# Patient Record
Sex: FEMALE | Race: WHITE | NOT HISPANIC OR LATINO | URBAN - METROPOLITAN AREA
[De-identification: names, ages, dates, MRNs, and addresses within clinical notes are randomized per-mention and may not be internally consistent; named-entity substitution may affect disease eponyms.]

---

## 2024-04-02 ENCOUNTER — TELEPHONE (OUTPATIENT)
Age: 39
End: 2024-04-02

## 2024-04-02 NOTE — TELEPHONE ENCOUNTER
Patient is requesting a call back to schedule with Surgical WM- she requests to have a voicemail left if she cannot answer the phone. Please call the patient back at [phone] to schedule with surgical.

## 2024-06-25 ENCOUNTER — OFFICE VISIT (OUTPATIENT)
Dept: BARIATRICS | Facility: CLINIC | Age: 39
End: 2024-06-25

## 2024-06-25 VITALS
HEIGHT: 65 IN | BODY MASS INDEX: 41.32 KG/M2 | HEART RATE: 78 BPM | TEMPERATURE: 97.4 F | SYSTOLIC BLOOD PRESSURE: 121 MMHG | WEIGHT: 248 LBS | DIASTOLIC BLOOD PRESSURE: 77 MMHG | RESPIRATION RATE: 17 BRPM

## 2024-06-25 DIAGNOSIS — K21.9 GASTROESOPHAGEAL REFLUX DISEASE, UNSPECIFIED WHETHER ESOPHAGITIS PRESENT: ICD-10-CM

## 2024-06-25 DIAGNOSIS — E66.01 OBESITY, CLASS III, BMI 40-49.9 (MORBID OBESITY) (HCC): Primary | ICD-10-CM

## 2024-06-25 PROBLEM — E66.813 OBESITY, CLASS III, BMI 40-49.9 (MORBID OBESITY): Status: ACTIVE | Noted: 2024-06-25

## 2024-06-25 PROBLEM — F41.9 ANXIETY AND DEPRESSION: Status: ACTIVE | Noted: 2024-06-25

## 2024-06-25 PROBLEM — N39.3 STRESS INCONTINENCE: Status: ACTIVE | Noted: 2024-06-25

## 2024-06-25 PROBLEM — M48.9 CERVICAL SPINE DISEASE: Status: ACTIVE | Noted: 2020-05-14

## 2024-06-25 PROBLEM — F32.A ANXIETY AND DEPRESSION: Status: ACTIVE | Noted: 2024-06-25

## 2024-06-25 PROCEDURE — 99203 OFFICE O/P NEW LOW 30 MIN: CPT | Performed by: PHYSICIAN ASSISTANT

## 2024-06-25 RX ORDER — OMEPRAZOLE 20 MG/1
20 CAPSULE, DELAYED RELEASE ORAL DAILY
COMMUNITY

## 2024-06-25 RX ORDER — SERTRALINE HYDROCHLORIDE 100 MG/1
100 TABLET, FILM COATED ORAL DAILY
COMMUNITY

## 2024-06-25 NOTE — ASSESSMENT & PLAN NOTE
On omeprazole.    -should improve with weight loss, dietary, and lifestyle changes  -EGD last year

## 2024-06-25 NOTE — ASSESSMENT & PLAN NOTE
-Discussed options of [facility]-Intensive Lifestyle Intervention Program, Very Low Calorie Diet-VLCD, Conservative Program, Rajan-En-Y Gastric Bypass, and Vertical Sleeve Gastrectomy and the role of weight loss medications.  -Initial weight loss goal of 5-10% weight loss for improved health  -Screening labs and records reviewed from prior    -Patient is interested in pursuing surgery from weight loss. She would like to avoid long term medication use.  QR code given to view information.  Discussed surgical process and she will schedule with surgeon to discuss

## 2024-07-02 ENCOUNTER — CONSULT (OUTPATIENT)
Dept: BARIATRICS | Facility: CLINIC | Age: 39
End: 2024-07-02
Payer: COMMERCIAL

## 2024-07-02 VITALS
HEIGHT: 65 IN | TEMPERATURE: 98.1 F | BODY MASS INDEX: 41.99 KG/M2 | WEIGHT: 252 LBS | HEART RATE: 103 BPM | SYSTOLIC BLOOD PRESSURE: 128 MMHG | DIASTOLIC BLOOD PRESSURE: 70 MMHG

## 2024-07-02 DIAGNOSIS — K21.9 GASTROESOPHAGEAL REFLUX DISEASE, UNSPECIFIED WHETHER ESOPHAGITIS PRESENT: ICD-10-CM

## 2024-07-02 DIAGNOSIS — F32.A ANXIETY AND DEPRESSION: ICD-10-CM

## 2024-07-02 DIAGNOSIS — F41.9 ANXIETY AND DEPRESSION: ICD-10-CM

## 2024-07-02 DIAGNOSIS — E66.01 OBESITY, CLASS III, BMI 40-49.9 (MORBID OBESITY) (HCC): Primary | ICD-10-CM

## 2024-07-02 PROCEDURE — 99204 OFFICE O/P NEW MOD 45 MIN: CPT | Performed by: SURGERY

## 2024-07-02 NOTE — PROGRESS NOTES
BARIATRIC INITIAL CONSULT - BARIATRIC SURGERY    Brittney Heart 38 y.o. female MRN: [id number]  Unit/Bed#:  Encounter: [id number]      HPI:  Brittney Heart is a 38 y.o. female who presents with a longstanding history of morbid obesity and inability to sustain a meaningful weight loss.    Here today to discuss bariatric options.    Visit type: initial visit    Symptoms: excess weight and inability to loss weight    Associated Symptoms: depressed mood and anxiety    Associated Conditions: abdominal obesity  Disease Complications: none  Weight Loss Interest: high  Previous Diet Trials: low calorie     Exercise Frequency:infrequency  Types of Exercise: walking        Review of Systems   All other systems reviewed and are negative.      Historical Information   No past medical history on file.  Past Surgical History:   Procedure Laterality Date    GALLBLADDER SURGERY  05/30/2023     Social History   Social History     Substance and Sexual Activity   Alcohol Use Never     Social History     Substance and Sexual Activity   Drug Use Never     Social History     Tobacco Use   Smoking Status Never    Passive exposure: Past   Smokeless Tobacco Never     Family History: non-contributory    Meds/Allergies   all medications and allergies reviewed  Allergies   Allergen Reactions    Penicillin G Rash       Objective       Current Vitals:            Invasive Devices       None                   Physical Exam  Vitals reviewed.   Constitutional:       General: She is not in acute distress.     Appearance: She is well-developed. She is not diaphoretic.   HENT:      Head: Normocephalic and atraumatic.      Right Ear: External ear normal.      Left Ear: External ear normal.      Nose: Nose normal.   Eyes:      General: No scleral icterus.        Right eye: No discharge.         Left eye: No discharge.      Conjunctiva/sclera: Conjunctivae normal.   Neurological:      Mental Status: She is alert and oriented to person, place, and  time.   Psychiatric:         Mood and Affect: Mood and affect normal.         Behavior: Behavior normal.         Thought Content: Thought content normal.         Judgment: Judgment normal.         Lab Results: I have personally reviewed pertinent lab results.    Imaging: I have personally reviewed pertinent reports.    EKG, Pathology, and Other Studies: I have personally reviewed pertinent reports.        Assessment/PLAN:    38 y.o. female with a long standing h/o of obesity and inability to sustain any meaningful weight loss on her own despite several attempts.    She is interested in the Laparoscopic gastric bypass possible sleeve gastrectomy.  We have discussed both options with her today.    As a part of her pre op evaluation, she will be referred to a cardiologist for risk stratification and for a sleep evaluation and consult to rule out obstructive sleep apnea if deemed necessary based on her score.    She needs an EGD to evaluate the anatomy of her GI tract prior to the operation.  I have spent over 30 minutes with her face to face in the office today discussing her options and details of the surgery. We have seen an animation of the surgery on the computer that illustrates how the operation is done and how the anatomy will be altered with the procedure. Over 50% of this was coordinating care.    I have used the MBSAQIP bariatric surgical risk/benefit calculator and we have discussed the results as part of the preop education.  She was given the opportunity to ask questions and I have answered all of them.  I have discussed and educated the patient with regards to the components of our multidisciplinary program and the importance of compliance and follow up in the post operative period. The patient was also instructed with regards to the importance of behavior modification, nutritional counseling, support meeting attendance and lifestyle changes that are important to ensure success.     Although there is a  great statistical chance of improvement or even resolution of most of her associated comorbidities, the results vary from patient to patient and they largely depend on her commitment and compliance.     I have reviewed instructions for stopping or tapering anti-obesity medications prior to surgery.      I have encouraged her to lose 5-10 lbs prior to the operation.      Leon Patricio MD  7/2/2024  3:25 PM

## 2024-07-02 NOTE — PROGRESS NOTES
- Height/Weight:  65 , 252 lb  - BMI:  41.9  - Medical conditions related to obesity: no  - Does the patient smoke/vape (nicotine, marijuana, hookah, etc): no  - StopBANG:  3/8  - Does the patient currently take a weight loss medication: no

## 2024-07-15 ENCOUNTER — CLINICAL SUPPORT (OUTPATIENT)
Dept: BARIATRICS | Facility: CLINIC | Age: 39
End: 2024-07-15

## 2024-07-15 DIAGNOSIS — E66.01 OBESITY, CLASS III, BMI 40-49.9 (MORBID OBESITY) (HCC): Primary | ICD-10-CM

## 2024-07-15 PROCEDURE — RECHECK

## 2024-07-15 NOTE — PROGRESS NOTES
Spoke to patient on the phone:    Patient is interested in the bariatric surgical process. Patient qualifies for surgery with current comorbidities and BMI. Discussed the entire surgical workup process and all requirements of [facility] program and patient's insurance. Answered all questions at the time of the phone call. All SW/RD questions redirected to the next appointment, the 2-hour evaluation.    Patient has been informed to contact insurance to verify there is Bariatric surgery  coverage written on the policy prior next appointment. Also to discuss of any out of pocket expense if insurance does not cover at 100%      Patient verbalized understanding of the surgical process at [facility] and had no further questions at this time.

## 2024-07-24 ENCOUNTER — TELEPHONE (OUTPATIENT)
Dept: BARIATRICS | Facility: CLINIC | Age: 39
End: 2024-07-24

## 2024-07-24 NOTE — TELEPHONE ENCOUNTER
January 5, 2023    Meaghan was seen today for recheck.    Diagnoses and all orders for this visit:    Urethral diverticulum  -     Case Request: CYSTOSCOPY, WITH OPEN EXCISION OF URETHRAL DIVERTICULUM; Standing  -     Case Request: CYSTOSCOPY, WITH OPEN EXCISION OF URETHRAL DIVERTICULUM    Mixed incontinence  -     Case Request: CYSTOSCOPY, WITH OPEN EXCISION OF URETHRAL DIVERTICULUM; Standing  -     Case Request: CYSTOSCOPY, WITH OPEN EXCISION OF URETHRAL DIVERTICULUM    Myalgia of pelvic floor  -     Case Request: CYSTOSCOPY, WITH OPEN EXCISION OF URETHRAL DIVERTICULUM; Standing  -     Case Request: CYSTOSCOPY, WITH OPEN EXCISION OF URETHRAL DIVERTICULUM    High-tone pelvic floor dysfunction  -     Case Request: CYSTOSCOPY, WITH OPEN EXCISION OF URETHRAL DIVERTICULUM; Standing  -     Case Request: CYSTOSCOPY, WITH OPEN EXCISION OF URETHRAL DIVERTICULUM    Other orders  -     Void on call to OR; Standing  -     Forced Air Warming Device; Standing  -     Cleanse skin for procedure; Standing  -     Notify Provider - Anticoagulants and Antiplatelets; Standing  -     Glucose monitor nursing POCT; Standing  -     NPO per Anesthesia Guidelines for Procedure/Surgery Except for: Meds; Standing  -     Apply Pneumatic Compression Device (PCD); Standing  -     Pneumatic Compression Device (PCD) (Equipment); Standing  -     ceFAZolin (ANCEF) intermittent infusion 2 g in 50 mL dextrose PREMIX  -     ceFAZolin (ANCEF) intermittent infusion 2 g in 50 mL dextrose PREMIX      Discussed that the treated of the urethral diverticulum is surgical.  Discussed that given her pelvic floor dysfunction however that the surgery may not help any pain that she is having and that in this setting I would stage any stress incontinence procedures.  Discussed the high likelihood of her having to go to pelvic floor physical therapy after the surgery as well.      We discussed that the risks to the procedure include but not limited to cardiovascular  LVM for patient to return call and provide subscriber information for insurance ([employer] / [employer]) and to upload insurance card for [facility] into [employer].   "risks of anesthesia, nerve injury, bleeding, infection, persistent or worsening stress incontinence or urge incontinence, need for post-operative parra catheter, need for further procedures including for recurrence, stricture, and fistula.  Patient expressed understanding and agreeable to proceed.     10 minutes were spent today on the day of the encounter in reviewing the EMR including interpretation of the MRI, direct patient care including surgical counseling, coordination of care and documentation    Delfina Allen MD MPH  (she/her/hers)   of Urology  [facility]      Subjective    She is here today for follow up after her MRI. She denies any changes in health since last visit    BP (!) 143/97   Pulse 88   Ht 1.651 m (5' 5\")   Wt 68 kg (150 lb)   BMI 24.96 kg/m    GENERAL: healthy, alert and no distress  EYES: Eyes grossly normal to inspection, conjunctivae and sclerae normal  HENT: normal cephalic/atraumatic.  External ears, nose and mouth without ulcers or lesions.  RESP: no audible wheeze, cough, or visible cyanosis.  No visible retractions or increased work of breathing.  Able to speak fully in complete sentences.  NEURO: Cranial nerves grossly intact, mentation intact and speech normal  PSYCH: mentation appears normal, affect normal/bright, judgement and insight intact, normal speech and appearance well-groomed    MRI images reviewed. On my interpretation there is a small urethral diverticulum posterior on the right side    CC  Patient Care Team:  Mirza Elliott MD as PCP - General (Family Practice)  Karl Hurt MD as MD (Urology)  Delfina Allen MD as MD (Urology)  Delfina Allen MD as Assigned Surgical Provider          "

## 2024-09-23 ENCOUNTER — TELEPHONE (OUTPATIENT)
Dept: BARIATRICS | Facility: CLINIC | Age: 39
End: 2024-09-23

## 2024-11-06 ENCOUNTER — TELEPHONE (OUTPATIENT)
Age: 39
End: 2024-11-06

## 2024-11-06 NOTE — TELEPHONE ENCOUNTER
"Pt called in after hours to r/s Eval appts. 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 Pt was told spots for Dec 2 will be on \"hold\" for her until office final approval. Please advise.  "

## 2024-11-07 NOTE — TELEPHONE ENCOUNTER
Ryan and confirmed the 12/2/24 eval appts are already schedule for 930 am-RD and 1030 am-SW that day, and to call back if she needs to r/s those appts

## 2024-11-26 NOTE — PROGRESS NOTES
"Bariatric Behavioral Health Evaluation     weight check  Surgeon:  Dr Patricio    Starting weight:  252 #  Today's weight: 252 #    Presenting Problem:   .Brittney Heart  is a 39 y.o.   female    :  1985   Patient presented with overall concerns of obesity.  Stated that weight has impacted quality of life and has current future concerns with lack of mobility, movement, chronic pain, and overall health.  Has attempted various weight loss plans in the past including exercise, and healthy eating    Patient is Interested in exploring bariatric surgery.as an option for  weight loss goals.        Is the patient seeking Bariatric Surgery?   YES  Sister in law is post surgery - and also members of her family.   Old co worker post bariatric surgery     Current Barriers to Change: work schedule     Realizes Post- Op Requirements? Yes, and will learn more meeting with the dietitians and social workers through the process     Pre-morbid level of function and history of present illness: GERD      Stressors and Supports: family members are supportive      Living situation: , two sons ( 11 &  4)   Dinner with family.  Cooking is shared.      Work: Teacher [employer]    Physical Activity: none currently.   Does have a treadmill at home.  Elliptical in home    Family History (medical, traditions, culture, rules/routines around food):    is from  \"[place]\"     Spouse  overweight    Older son sugar cravings.      Mental Health, Trauma and Substance use Assessment    Psychiatric/Psychological Treatment Diagnosis:   Anxiety and Depression  w/Rx followed by PCP.      History of Eating Disorder:  none noted     Outpatient Counselor Yes  Has current therapy.  [facility]  (In person)      Psychiatrist -  Yes.  Dr. Orellana     Have you had any Mental Health Higher level of care (ED, PHP or Inpatient ) Treatment? No      Drug and/or Alcohol use and treatment history: none     Have you had any " Substance Use Treatment? No     Tobacco/Vaping History: none  Patient agrees to remain nicotine free post surgery.    Domestic Violence: No      Abuse or Trauma History: none noted      Risk Assessment    Identified support system intact.     Risk of harm to self or others:  none noted during evaluation  No HI/SI      Presence of Audio/Visual Hallucinations: Not reported during evaluation     Access to weapons: Not reported during evaluation     Observation: this interview only (SW and RD will follow Brittney Heart through the bariatric program)     Based on the previous information, the client presents the following risk of harm to self or others:  Low risk        Physical/Mental Health Status:              Appearance: appropriate           Sociability: average           Affect: appropriate           Mood: calm           Thought Process: coherent           Speech: normal           Content: no impairment           Orientation: person  Yes , place  Yes , time  Yes , normal attention span  Yes , normal memory  Yes   and normal judgement  Yes            Insight: emotional  good       BARIATRIC SURGERY EDUCATION CHECKLIST    Patient has received the following education related to the bariatric surgery process and understands:    Patients may be required to complete a psychiatric evaluation and receive clearance for surgery from mental health provider.    Patients who undergo weight loss surgery are at higher risk of increased mental health concerns and suicide attempts.    Patients may be required to complete a full substance abuse evaluation and then complete all treatment recommendations prior to surgery.    If diagnosis of abuse/dependence results, patient may be required to remain sober for one (1) year before having bariatric surgery.    Patients on psychiatric medications should check with their provider to discuss psychiatric medications and the changes in absorption.  Patient should discuss all time release  medications with provider and take all medications as prescribed.    The recommendation is that there is no use of any tobacco products, Hookah or vapes for the bariatric post-operation patient.    Bariatric surgery patients should not consume alcohol as a post-operative patient as it may increase risk of numerous health conditions including but not limited to alcohol abuse and ulcers.    There is a possibility of weight regain if patient does not follow all program guidelines and recommendations.    Bariatric surgery patients should exercise thirty (30) to sixty (60) minutes per day to maintain post-surgical weight loss.    Research indicates that bariatric patients are more successful when they see a therapist for up to two (2) years post-op.    Patients will follow all medical and dietary recommendations provided.    Patient will keep all scheduled appointments and follow up with their physician for a minimum of five (5) years.    Patient will take all vitamins as recommended.  Post-operative vitamins are life-long.    There is a goal month set.  All requirements should be met by this time. Don't wait to get started!    There is a deadline month set.  All requirements must be finished by this time and if not, the patient will be halted in the surgery process. The patient can be referred to the medical weight management program or can come back to the surgical program once the unfinished tasks from the previous program are completed.      Female patients of childbearing years are informed that pregnancy is not recommended until 12 - 18 months post-op.      Recommendations: Recommended for surgery  yes    Social Service Note:  Patient presented for behavioral health evaluation for the bariatric program.   Positive for Mental Health with diagnosis of Anxiety and Depression w/Rx.    Current talk therapy.   Current access to Psychiatry.   No reported Drug and/or Alcohol abuse or treatment.  No reported tobacco use.   Patient educated regarding the impact of nicotine and alcohol on the post surgery bariatric patient. Patient has a negative family history of tobacco and alcohol addiction.     Patient has not reported contraindications for bariatric surgery.  Patient will begin making changes with relationship with food through behavior modifications and mindful techniques.  Tracking food intake for one week every three months can assist with weight maintenance and self accountability for post surgery success.   and Dietitian follow up visits are available for pre and post surgery support.  Bariatric support group is available monthly.   Patient verbalized the ability to start making changes and create a healthy relationship around nutrition.     Patient meets criteria for surgery at this time and is referred back to the bariatric surgeon.        Tavo Talbot LCSW

## 2024-12-02 ENCOUNTER — TELEPHONE (OUTPATIENT)
Dept: BARIATRICS | Facility: CLINIC | Age: 39
End: 2024-12-02

## 2024-12-02 ENCOUNTER — CLINICAL SUPPORT (OUTPATIENT)
Dept: BARIATRICS | Facility: CLINIC | Age: 39
End: 2024-12-02

## 2024-12-02 VITALS — BODY MASS INDEX: 41.99 KG/M2 | HEIGHT: 65 IN | WEIGHT: 252 LBS

## 2024-12-02 DIAGNOSIS — E66.01 OBESITY, CLASS III, BMI 40-49.9 (MORBID OBESITY) (HCC): Primary | ICD-10-CM

## 2024-12-02 DIAGNOSIS — K21.9 GASTROESOPHAGEAL REFLUX DISEASE, UNSPECIFIED WHETHER ESOPHAGITIS PRESENT: ICD-10-CM

## 2024-12-02 DIAGNOSIS — Z01.818 PRE-OPERATIVE CLEARANCE: ICD-10-CM

## 2024-12-02 DIAGNOSIS — F41.9 ANXIETY AND DEPRESSION: ICD-10-CM

## 2024-12-02 DIAGNOSIS — F32.A ANXIETY AND DEPRESSION: ICD-10-CM

## 2024-12-02 PROCEDURE — RECHECK: Performed by: DIETITIAN, REGISTERED

## 2024-12-02 PROCEDURE — RECHECK

## 2024-12-02 NOTE — TELEPHONE ENCOUNTER
Spoke with patient and stressed the importance of being consistent with the required visits of her insurance. I informed her that if she is not consistent with these appointments her case may be denied by the insurance. Patient verbalized an understanding of this information.

## 2024-12-02 NOTE — PROGRESS NOTES
Bariatric Nutrition Assessment Note    Type of surgery    Preop- sleeve   Surgery Date: TBD- pt has 12 session program requirement   Surgeon: Rahat Surgeons: Dr. Patricio     Nutrition Assessment   Brittney Marturano  39 y.o.  female     Wt with BMI of 25: 150.1 lbs   Pre-Op Excess Wt: 109.1 lbs   There were no vitals taken for this visit.    Wt Readings from Last 3 Encounters:   07/02/24 114 kg (252 lb)   06/25/24 112 kg (248 lb)        Saeid Higuera Equation:    [id number]  Weight Maintenance 2195  Estimated calories for weight loss 2359-4898 ( 1-2# per wk wt loss - sedentary )  Estimated protein needs  grams per day (1.0-1.5 gms/kg IBW )   Estimated fluid needs 68-79 oz per day (30-35 ml/kg IBW )      NAFLD Fibrosis Score cannot be calculated. One or more of the required components has not been resulted in the past year     Weight History   Onset of Obesity: Adulthood   Family history of obesity: Yes- grandmother and great aunts- one had gastric surgery on paternal side  Wt Loss Attempts: Commercial Programs (Weight Watchers, Louise Sean, etc.)  Fasting  High Protein/Low CHO diets (Atkins, [employer], etc.)  Nutrition Counseling with RD  Patient has tried the above for 6 months or more with insufficient weight loss or weight regain, which is why patient has requested to be evaluated for weight loss surgery today  Maximum Wt Lost: 40# with the Atkins       Review of History and Medications   No past medical history on file.  Past Surgical History:   Procedure Laterality Date    GALLBLADDER SURGERY  05/30/2023     Social History     Socioeconomic History    Marital status: /Civil Union     Spouse name: Not on file    Number of children: Not on file    Years of education: Not on file    Highest education level: Not on file   Occupational History    Not on file   Tobacco Use    Smoking status: Never     Passive exposure: Past    Smokeless tobacco: Never   Vaping Use    Vaping status: Never Used    Substance and Sexual Activity    Alcohol use: Never    Drug use: Never    Sexual activity: Yes     Partners: Male   Other Topics Concern    Not on file   Social History Narrative    Not on file     Social Drivers of Health     Financial Resource Strain: Not on file   Food Insecurity: Not on file   Transportation Needs: Not on file   Physical Activity: Not on file   Stress: Not on file   Social Connections: Not on file   Intimate Partner Violence: Not on file   Housing Stability: Not on file       Current Outpatient Medications:     Levonorgestrel (MIRENA) 20 MCG/DAY IUD, 1 each by Intrauterine Device route Once every 8 years, Disp: , Rfl:     omeprazole (PriLOSEC) 20 mg delayed release capsule, Take 20 mg by mouth daily, Disp: , Rfl:     sertraline (ZOLOFT) 100 mg tablet, Take 100 mg by mouth daily, Disp: , Rfl:       Food Intake and Lifestyle Assessment   Food Intake Assessment completed via usual diet recall  Breakfast: Coffee at [employer] - flavored with cream   Sometimes makes coffee at home with sugar   7 to 7:30 am Usually eats on way to work, Belvita breakfast biscusits to protein shake( [employer] brand Equate )  to fig bars or do nut  Quick and easy   Snack: 0   Lunch: 11 am - packs a lunch   [place] or dinner leftovers or can of soup   Snack: - Cheeze Prateek or pretzels or poptart or fig bars or PB crackers 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   Dinner: 5 to 6 pm   Protein , vegetable and starch ( rice or potatoes or noodles )   Likes chicken , pasta with sausage or meatballs meatloaf , occasional fish ( white )   Snack: bowl of cereal ( honey nut cherrios, raison bran crunch ) with LF milk , or oreos or cheese and cracker snack mix or sunchips   Beverage intake: 2% milk, diet soda, coffee/tea, and diet iced tea, small amount water   Protein supplement: Equate protein shake once per week to save time   Estimated protein intake per day: ~ 50-60 grams per day   Estimated fluid intake per day: diet soda - 1-2 cans  "( 24 oz ) diet iced ( 2 to 3 20 oz glasses ) 10 to 20 oz   ~24 oz of coffee   Meals eaten away from home: take twice per week, 1-2 times per week for breakfast   Typical meal pattern: 2-3 meals per day and 1-2 snacks per day  Eating Behaviors: Consumption of high calorie/ high fat foods, Consumption of high calorie beverages, Large portion sizes, Mindless eating, and Emotional eating  Sugar sweet things are a crutch emotionally   Food allergies or intolerances:   Allergies   Allergen Reactions    Penicillin G Rash     Cultural or Hinduism considerations: N/A    Physical Assessment  Physical Activity  Types of exercise: activities of daily living   Current physical limitations: time is limiting factor     Psychosocial Assessment   Support systems: spouse  Socioeconomic factors: works as a teach, 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       Patient educated and handouts provided.  Surgical changes to stomach / GI  Capacity of post-surgery stomach  Diet progression  Adequate hydration  Sugar and fat restriction to decrease \"dumping syndrome\"  Fat restriction to decrease steatorrhea  Expected weight loss  Weight loss plateaus/ possibility of weight regain  Exercise  Suggestions for pre-op diet  Nutrition considerations after surgery  Protein supplements  Meal planning and preparation  Appropriate carbohydrate, protein, and fat intake, and food/fluid choices to maximize safe weight loss, nutrient intake, and tolerance   Dietary and lifestyle changes  Possible problems with poor eating habits  Intuitive eating  Techniques " for self monitoring and keeping daily food journal  Potential for food intolerance after surgery, and ways to deal with them including: lactose intolerance, nausea, reflux, vomiting, diarrhea, food intolerance, appetite changes, gas  Vitamin / Mineral supplementation of Multivitamin with minerals, Calcium, Vitamin B12, Iron, and Vitamin D    Patient is not currently pregnant and doesn't desire to become pregnant a minimum of one year post-op- pt has Mirena     Education provided to: patient    Barriers to learning: No barriers identified    Readiness to change: preparation    Prior research on procedure: discussed with provider    Comprehension: needs reinforcement and verbalizes understanding     Expected Compliance: good  Recommendations  Pt is an appropriate candidate for surgery. Yes    Evaluation / Monitoring  Dietitian to Monitor: Eating pattern as discussed Tolerance of nutrition prescription Body weight Lab values Physical activity Bowel pattern    Goals  Eliminate sugar sweetened beverages, Food journal, Complete lession plans 1-6, Eat 3 meals per day, Eliminate mindless snacking, and track   Recommend an adult multivitamin and mineral , and 2000 IU ( 50 mcg ) of vitamin D3 per day   Food log 1500 calories/ 75 grams protein     Time Spent:   1 Hour

## 2024-12-09 ENCOUNTER — PREP FOR PROCEDURE (OUTPATIENT)
Dept: BARIATRICS | Facility: CLINIC | Age: 39
End: 2024-12-09

## 2024-12-09 ENCOUNTER — CLINICAL SUPPORT (OUTPATIENT)
Dept: BARIATRICS | Facility: CLINIC | Age: 39
End: 2024-12-09

## 2024-12-09 VITALS — BODY MASS INDEX: 42.1 KG/M2 | WEIGHT: 253 LBS

## 2024-12-09 DIAGNOSIS — E66.01 OBESITY, CLASS III, BMI 40-49.9 (MORBID OBESITY) (HCC): Primary | ICD-10-CM

## 2024-12-09 DIAGNOSIS — Z98.84 BARIATRIC SURGERY STATUS: Primary | ICD-10-CM

## 2024-12-09 PROCEDURE — RECHECK

## 2024-12-09 NOTE — PROGRESS NOTES
Behavioral Health Follow Up Note:      2 / 12  Weight Check:  Surgeon: Dr. Leon Patricio  Sleeve    Starting weight 252 #  Today's weight 253.0 #    Surgery month:  APR-MAY  Surgery deadline: AUG    Mental health and wellness - Patient is appropriately making changes based off the information contained in the bariatric manual.  Patient is modifying behaviors and demonstrating adapting to change.   Purchased vitamins, forgets to take them-encouraged to use alarm reminder. Reviewing bariatric manual. Feels mental health is good-good support system with S/O and family member.      Eating behaviors - tracking food using [employer] kelly, consuming 3 meals and 1-2 snacks. Started measuring food using measuring cups and scale. Being mindful of portion sizes. Savoring food more now. Protein shake in AM. Working to limit junk food in home to set up environment for success. Diet tea often, needs to improve upon water.     Activity -  feels it is daunting to start exercising. Has treadmill.     Progress toward program requirements    Workflow:  Psych and/or D+A additional documentation: n/a  PCP Letter: needs  Support Group: RECOMMENDED  Surgeon Appt.: 7/2/2024  EGD : needs  Cardiac Risk Assessment: needs-FEB  Sleep Studies: N/A  Bloodwork: ordered    Goals: increase water intake, continue measuring food, be mindful of food intake. Continue following the bariatric recommendations to prepare for bariatric surgery

## 2024-12-23 ENCOUNTER — CLINICAL SUPPORT (OUTPATIENT)
Dept: BARIATRICS | Facility: CLINIC | Age: 39
End: 2024-12-23

## 2024-12-23 VITALS — BODY MASS INDEX: 41.89 KG/M2 | WEIGHT: 251.7 LBS

## 2024-12-23 DIAGNOSIS — E66.01 MORBID (SEVERE) OBESITY DUE TO EXCESS CALORIES (HCC): Primary | ICD-10-CM

## 2024-12-23 DIAGNOSIS — E66.01 OBESITY, CLASS III, BMI 40-49.9 (MORBID OBESITY) (HCC): Primary | ICD-10-CM

## 2024-12-23 PROCEDURE — RECHECK

## 2024-12-23 NOTE — PROGRESS NOTES
Spoke to patient in person:    Patient is interested in the bariatric surgical process. Patient qualifies for surgery with current comorbidities and BMI. Discussed the entire surgical workup process and all requirements of [facility] program and patient's insurance. Answered all questions at the time of the phone call. All SW/RD questions redirected to the next appointment, the 2-hour evaluation.    Patient has been informed to contact insurance to verify there is Bariatric surgery  coverage written on the policy prior next appointment. Also to discuss of any out of pocket expense if insurance does not cover at 100%      Patient verbalized understanding of the surgical process at [facility] and had no further questions at this time.

## 2024-12-23 NOTE — PROGRESS NOTES
"Behavioral Health Follow Up Note:      3 / 12  Weight Check:  Surgeon: Dr. Leon Patricio (encouraged her to lose 5-10 lbs prior to the operation)  SLEEVE    Starting weight 252.0 # (discussed being at or under starting weight)  Today's weight 251.7 #    Surgery month:  APR-MAY  Surgery deadline: AUG    Mental health and wellness - Patient is appropriately making changes based off the information contained in the bariatric manual.  Patient is modifying behaviors and demonstrating adapting to change.   Discussed stress and eating around the holidays-baking cookies are a trigger food so we discussed ways to limit temptations, hosting dinner tomorrow. Taking multivitamin. Sleep is \"ok\" depending on they day. Going through bariatric manual. Coping skills: arts & crafts, reading, crocheting.    Eating behaviors - measuring or weighing food especially carbs at dinner. Reading food labels. Tracking food in [employer] kelly. Consuming 3 meals per day-protein drink for breakfast, 2 snacks-fiber one brown or Special K small pastry crisps. . Lunch & dinner- meat protein, starch, vegetables or sandwiches & baked chips. Researching healthy recipes online. Meal prepping & portioning out food.one diet soda, some douglas coffee and regular tea. Water- 20-30oz. Late night snacking-cereal or diet tea. Eating slowly, chewing well. Tried decreasing water while eating for a couple days.       Activity -  walking on treadmill 20-25 mins 2-3x/week    Progress toward program requirements    Workflow:  Psych and/or D+A additional documentation: n/a  PCP Letter: needs  Support Group: RECOMMENDED  Surgeon Appt.: 7/2/2024  EGD : needs 2/5/25  Cardiac Risk Assessment: needs-FEB  Sleep Studies: N/A  Bloodwork: ordered     Weight check 4 / 12 scheduled with RD.    Goals:  continue to increase water, being mindful of late night snacking. Continue following the bariatric recommendations to prepare for bariatric surgery    "

## 2025-01-02 ENCOUNTER — CLINICAL SUPPORT (OUTPATIENT)
Dept: BARIATRICS | Facility: CLINIC | Age: 40
End: 2025-01-02

## 2025-01-02 VITALS — BODY MASS INDEX: 41.34 KG/M2 | WEIGHT: 248.4 LBS

## 2025-01-02 DIAGNOSIS — E66.01 OBESITY, CLASS III, BMI 40-49.9 (MORBID OBESITY) (HCC): Primary | ICD-10-CM

## 2025-01-02 PROCEDURE — RECHECK: Performed by: DIETITIAN, REGISTERED

## 2025-01-02 NOTE — PROGRESS NOTES
Bariatric Nutrition Assessment Note    Type of surgery    Preop- sleeve   Surgery Date: Expected date  May/ June 2025  Pt has 12 session program requirement   Today is 4/12 of program requirement   Surgeon: Rahat Surgeons: Dr. Patricio     Nutrition Assessment   Brittney Una  39 y.o.  female     Wt with BMI of 25: 150.1 lbs   Pre-Op Excess Wt: 109.1 lbs   Wt 113 kg (248 lb 6.4 oz)   BMI 41.34 kg/m²   -3# x 1 week  -3.5# x 1 month     Wt Readings from Last 3 Encounters:   01/02/25 113 kg (248 lb 6.4 oz)   12/23/24 114 kg (251 lb 11.2 oz)   12/09/24 115 kg (253 lb)        Saeid Higuera Equation:    [id number]=[id number]  Weight Maintenance 2195  Estimated calories for weight loss 8716-9843 ( 1-2# per wk wt loss - sedentary )  Estimated protein needs  grams per day (1.0-1.5 gms/kg IBW )   Estimated fluid needs 68-79 oz per day (30-35 ml/kg IBW )      NAFLD Fibrosis Score cannot be calculated. One or more of the required components has not been resulted in the past year     Weight History   Onset of Obesity: Adulthood   Family history of obesity: Yes- grandmother and great aunts- one had gastric surgery on paternal side  Wt Loss Attempts: Commercial Programs (Weight Watchers, Louise Sean, etc.)  Fasting  High Protein/Low CHO diets (Atkins, [place], etc.)  Nutrition Counseling with RD  Patient has tried the above for 6 months or more with insufficient weight loss or weight regain, which is why patient has requested to be evaluated for weight loss surgery today  Maximum Wt Lost: 40# with the Atkins       Review of History and Medications   No past medical history on file.  Past Surgical History:   Procedure Laterality Date    GALLBLADDER SURGERY  05/30/2023     Social History     Socioeconomic History    Marital status: /Civil Union     Spouse name: Not on file    Number of children: Not on file    Years of education: Not on file    Highest education level: Not on file   Occupational History    Not on file    Tobacco Use    Smoking status: Never     Passive exposure: Past    Smokeless tobacco: Never   Vaping Use    Vaping status: Never Used   Substance and Sexual Activity    Alcohol use: Never    Drug use: Never    Sexual activity: Yes     Partners: Male   Other Topics Concern    Not on file   Social History Narrative    Not on file     Social Drivers of Health     Financial Resource Strain: Not on file   Food Insecurity: Not on file   Transportation Needs: Not on file   Physical Activity: Not on file   Stress: Not on file   Social Connections: Not on file   Intimate Partner Violence: Not on file   Housing Stability: Not on file       Current Outpatient Medications:     Levonorgestrel (MIRENA) 20 MCG/DAY IUD, 1 each by Intrauterine Device route Once every 8 years, Disp: , Rfl:     omeprazole (PriLOSEC) 20 mg delayed release capsule, Take 20 mg by mouth daily, Disp: , Rfl:     sertraline (ZOLOFT) 100 mg tablet, Take 100 mg by mouth daily, Disp: , Rfl:       Food Intake and Lifestyle Assessment   Food Intake Assessment completed via usual diet recall  Breakfast: Coffee at [employer] - flavored with cream   Sometimes makes coffee at home with sugar   7 to 7:30 am Usually eats on way to work, [employer] breakfast biscusits to protein shake( [employer] brand Equate )  to fig bars or do nut  Quick and easy   Snack: 0   Lunch: 11 am - packs a lunch   [place] or dinner leftovers or can of soup   Snack: - Cheeze Prateek or pretzels or poptart or fig bars or PB crackers 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   Dinner: 5 to 6 pm   Protein , vegetable and starch ( rice or potatoes or noodles )   Likes chicken , pasta with sausage or meatballs meatloaf , occasional fish ( white )   Snack: bowl of cereal ( honey nut cherrios, raison bran crunch ) with LF milk , or oreos or cheese and cracker snack mix or sunchips   Beverage intake: 2% milk, diet soda, coffee/tea, and diet iced tea, small amount water   Protein supplement: Equate protein shake  "once per week to save time   Estimated protein intake per day: ~ 50-60 grams per day   Estimated fluid intake per day: diet soda - 1-2 cans ( 24 oz ) diet iced ( 2 to 3 20 oz glasses ) 10 to 20 oz   ~24 oz of coffee   Meals eaten away from home: take twice per week, 1-2 times per week for breakfast   Typical meal pattern: 2-3 meals per day and 1-2 snacks per day  Eating Behaviors: Consumption of high calorie/ high fat foods, Consumption of high calorie beverages, Large portion sizes, Mindless eating, and Emotional eating  Sugar sweet things are a crutch emotionally   Food allergies or intolerances:   Allergies   Allergen Reactions    Penicillin G Rash     Cultural or Gnosticism considerations: N/A    Physical Assessment  Physical Activity  Types of exercise: activities of daily living   Current physical limitations: time is limiting factor     Psychosocial Assessment   Support systems: spouse  Socioeconomic factors: works as a teach, 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       Patient educated and handouts provided.  Surgical changes to stomach / GI  Capacity of post-surgery stomach  Diet progression  Adequate hydration  Sugar and fat restriction to decrease \"dumping syndrome\"  Fat restriction to decrease steatorrhea  Expected weight loss  Weight loss plateaus/ possibility of weight regain  Exercise  Suggestions for pre-op diet  Nutrition considerations after surgery  Protein supplements  Meal planning and preparation  Appropriate carbohydrate, protein, and fat intake, and food/fluid choices to maximize safe weight " loss, nutrient intake, and tolerance   Dietary and lifestyle changes  Possible problems with poor eating habits  Intuitive eating  Techniques for self monitoring and keeping daily food journal  Potential for food intolerance after surgery, and ways to deal with them including: lactose intolerance, nausea, reflux, vomiting, diarrhea, food intolerance, appetite changes, gas  Vitamin / Mineral supplementation of Multivitamin with minerals, Calcium, Vitamin B12, Iron, and Vitamin D    Patient is not currently pregnant and doesn't desire to become pregnant a minimum of one year post-op- pt has Mirena     Education provided to: patient    present and supportive     Barriers to learning: No barriers identified    Readiness to change: preparation/action     Prior research on procedure: discussed with provider    Comprehension: needs reinforcement and verbalizes understanding     Expected Compliance: good  Recommendations  Pt is an appropriate candidate for surgery. Yes    Workflow: (Incomplete in Bold):  Psych and/or D+A Clearance: n/a  Blood Work: ordered   PCP letter: needs  Surgeon Appt: done  EGD: 2/5/2025  Cardiac Risk Assessment with ECG: referral placed   Sleep Studies: N/A  Nicotine test: n/a   Pre-Operative Program: 4/12  NAFLD Score Calculated: Needs labs   Hepatology consult: n/a  Under Initial Office weight: yes     Evaluation / Monitoring  Dietitian to Monitor: Eating pattern as discussed Tolerance of nutrition prescription Body weight Lab values Physical activity Bowel pattern  Patient is taking her vitamins and minerals as recommended. Tracks her steps on her phone for movement, generally 3000 steps per day.   She also does the treadmill for 15 to 20 minutes several days per week.  She dropped off food logging over the holidays but plans to get back on track in the New Year.  She has been decreasing her liquids with meals by using a smaller glass and taking sips.  Overall, making changes in preparation  for surgery.     Goals  Eliminate sugar sweetened beverages, Food journal, Complete lession plans 1-6, Eat 3 meals per day, Eliminate mindless snacking, and track   Food log 1500 calories/ 75 grams protein   Schedule cardiology for February   Practice not drinking before, during and after meals    Start with 15/15 - then 30/30 and progress to 30/60 rule  Continue to track steps > 2000 per day   Treadmill 3-4 times per week - track target heart rate.     Time Spent:   30 minutes

## 2025-01-07 NOTE — PROGRESS NOTES
"Behavioral Health Follow Up Note:      5 / 12  Weight Check:  Surgeon:  Dr. Leon Patricio (encouraged her to lose 5-10 lbs prior to the operation)  SLEEVE    Starting weight 252  # (discussed need to be (at minimum) at or below starting weight at time case is submitted to insurance per Ins Coordinator discretion)  Today's weight 249.5 #      Surgery month:  APR-MAY  Surgery deadline: AUG    Mental health and wellness - Patient is appropriately making changes based off the information contained in the bariatric manual.  Patient is modifying behaviors and demonstrating adapting to change.   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 Trying to create new routine and schedule for lifestyle change; feels tired and wiped out.  Sleeping through night. Discussed ways to resetting rather than focusing on \"bad choices\". Coping skills: reading, crocheting.    Eating behaviors - did not meal prep lunches this week. Hot dogs with crescent roll, take out-[employer]. Splitting dessert instead of ordering own dessert.  3 meals daily with 2 snacks. Water- at least 30oz. Eating slowly, chewing well. Using water flavors-sugar free. Buying better food options for house.    Activity -  limited. Discussed using resistance bands, dedicating 10 mins for movement including videos with children or spontaneous walks throughout the day.     Progress toward program requirements    Workflow: reviewed with Brittney  Psych and/or D+A additional documentation: n/a  PCP Letter: needs  Support Group: RECOMMENDED  Surgeon Appt.: 7/2/2024  EGD : needs 2/5/25  Cardiac Risk Assessment: needs-3/6/25  Sleep Studies: N/A  Bloodwork: ordered     Weight check 6 / 12 scheduled with RD.     Goals:  continue mindful eating and listening to hunger cues. Work on portioning and tracking food twice weekly to compare week day and weekend consumption. Continue following the bariatric recommendations to prepare for bariatric surgery    "

## 2025-01-10 ENCOUNTER — CLINICAL SUPPORT (OUTPATIENT)
Dept: BARIATRICS | Facility: CLINIC | Age: 40
End: 2025-01-10

## 2025-01-10 VITALS — BODY MASS INDEX: 41.52 KG/M2 | WEIGHT: 249.5 LBS

## 2025-01-10 DIAGNOSIS — E66.01 OBESITY, CLASS III, BMI 40-49.9 (MORBID OBESITY) (HCC): Primary | ICD-10-CM

## 2025-01-10 PROCEDURE — RECHECK

## 2025-01-20 ENCOUNTER — CLINICAL SUPPORT (OUTPATIENT)
Dept: BARIATRICS | Facility: CLINIC | Age: 40
End: 2025-01-20

## 2025-01-20 VITALS — BODY MASS INDEX: 41.7 KG/M2 | WEIGHT: 250.6 LBS

## 2025-01-20 DIAGNOSIS — E66.01 OBESITY, CLASS III, BMI 40-49.9 (MORBID OBESITY) (HCC): Primary | ICD-10-CM

## 2025-01-20 PROCEDURE — RECHECK: Performed by: DIETITIAN, REGISTERED

## 2025-01-20 NOTE — PROGRESS NOTES
Bariatric Nutrition Assessment Note    Type of surgery    Preop- sleeve   Surgery Date: Expected date  May/ June 2025  Pt has 12 session program requirement   Today is 6/12 of program requirement   Pt is coming in twice per month to meet her program requirement   Surgeon: Rahat Surgeons: Dr. Patricio     Nutrition Assessment   Brittney Una  39 y.o.  female     Wt with BMI of 25: 150.1 lbs   Pre-Op Excess Wt: 109.1 lbs   Wt 114 kg (250 lb 9.6 oz)   BMI 41.70 kg/m²      Pt maintained her weight over  the past 2 weeks     Wt Readings from Last 3 Encounters:   01/10/25 113 kg (249 lb 8 oz)   01/02/25 113 kg (248 lb 6.4 oz)   12/23/24 114 kg (251 lb 11.2 oz)        Saeid Higuera Equation:    [id number]=4889  Weight Maintenance 2195  Estimated calories for weight loss 6593-0979 ( 1-2# per wk wt loss - sedentary )  Estimated protein needs  grams per day (1.0-1.5 gms/kg IBW )   Estimated fluid needs 68-79 oz per day (30-35 ml/kg IBW )      NAFLD Fibrosis Score cannot be calculated. One or more of the required components has not been resulted in the past year     Weight History   Onset of Obesity: Adulthood   Family history of obesity: Yes- grandmother and great aunts- one had gastric surgery on paternal side  Wt Loss Attempts: Commercial Programs (Weight Watchers, Louise Sean, etc.)  Fasting  High Protein/Low CHO diets (Atkins, Et3arraf, etc.)  Nutrition Counseling with RD  Patient has tried the above for 6 months or more with insufficient weight loss or weight regain, which is why patient has requested to be evaluated for weight loss surgery today  Maximum Wt Lost: 40# with the Atkins       Review of History and Medications   No past medical history on file.  Past Surgical History:   Procedure Laterality Date    GALLBLADDER SURGERY  05/30/2023     Social History     Socioeconomic History    Marital status: /Civil Union     Spouse name: Not on file    Number of children: Not on file    Years of education:  Not on file    Highest education level: Not on file   Occupational History    Not on file   Tobacco Use    Smoking status: Never     Passive exposure: Past    Smokeless tobacco: Never   Vaping Use    Vaping status: Never Used   Substance and Sexual Activity    Alcohol use: Never    Drug use: Never    Sexual activity: Yes     Partners: Male   Other Topics Concern    Not on file   Social History Narrative    Not on file     Social Drivers of Health     Financial Resource Strain: Not on file   Food Insecurity: Not on file   Transportation Needs: Not on file   Physical Activity: Not on file   Stress: Not on file   Social Connections: Not on file   Intimate Partner Violence: Not on file   Housing Stability: Not on file       Current Outpatient Medications:     Levonorgestrel (MIRENA) 20 MCG/DAY IUD, 1 each by Intrauterine Device route Once every 8 years, Disp: , Rfl:     omeprazole (PriLOSEC) 20 mg delayed release capsule, Take 20 mg by mouth daily, Disp: , Rfl:     sertraline (ZOLOFT) 100 mg tablet, Take 100 mg by mouth daily, Disp: , Rfl:       Food Intake and Lifestyle Assessment   Food Intake Assessment completed via usual diet recall  Breakfast: Coffee at [employer] - flavored with cream   Sometimes makes coffee at home with sugar   7 to 7:30 am Usually eats on way to work, [employer] breakfast biscusits to protein shake( [employer] brand Equate )  to fig bars or do nut  Quick and easy   Snack: 0   Lunch: 11 am - packs a lunch   [place] or dinner leftovers or can of soup   Snack: - Cheeze Prateek or pretzels or poptart or fig bars or PB crackers 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   Dinner: 5 to 6 pm   Protein , vegetable and starch ( rice or potatoes or noodles )   Likes chicken , pasta with sausage or meatballs meatloaf , occasional fish ( white )   Snack: bowl of cereal ( honey nut cherrios, raison bran crunch ) with LF milk , or oreos or cheese and cracker snack mix or sunchips   Beverage intake: 2% milk, diet soda,  "coffee/tea, and diet iced tea, small amount water   Protein supplement: Equate protein shake once per week to save time   Estimated protein intake per day: ~ 50-60 grams per day   Estimated fluid intake per day: diet soda - 1-2 cans ( 24 oz ) diet iced ( 2 to 3 20 oz glasses ) 10 to 20 oz   ~24 oz of coffee   Meals eaten away from home: take twice per week, 1-2 times per week for breakfast   Typical meal pattern: 2-3 meals per day and 1-2 snacks per day  Eating Behaviors: Consumption of high calorie/ high fat foods, Consumption of high calorie beverages, Large portion sizes, Mindless eating, and Emotional eating  Sugar sweet things are a crutch emotionally   Food allergies or intolerances:   Allergies   Allergen Reactions    Penicillin G Rash     Cultural or Congregational considerations: N/A    Physical Assessment  Physical Activity  Types of exercise: activities of daily living   Current physical limitations: time is limiting factor     Psychosocial Assessment   Support systems: spouse  Socioeconomic factors: works as a teach, 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       Patient educated and handouts provided.  Surgical changes to stomach / GI  Capacity of post-surgery stomach  Diet progression  Adequate hydration  Sugar and fat restriction to decrease \"dumping syndrome\"  Fat restriction to decrease steatorrhea  Expected weight loss  Weight loss plateaus/ possibility of weight regain  Exercise  Suggestions for pre-op diet  Nutrition considerations after surgery  Protein supplements  Meal planning and " preparation  Appropriate carbohydrate, protein, and fat intake, and food/fluid choices to maximize safe weight loss, nutrient intake, and tolerance   Dietary and lifestyle changes  Possible problems with poor eating habits  Intuitive eating  Techniques for self monitoring and keeping daily food journal  Potential for food intolerance after surgery, and ways to deal with them including: lactose intolerance, nausea, reflux, vomiting, diarrhea, food intolerance, appetite changes, gas  Vitamin / Mineral supplementation of Multivitamin with minerals, Calcium, Vitamin B12, Iron, and Vitamin D    Patient is not currently pregnant and doesn't desire to become pregnant a minimum of one year post-op- pt has Mirena     Education provided to: patient      Barriers to learning: No barriers identified    Readiness to change: preparation/action     Prior research on procedure: discussed with provider    Comprehension: needs reinforcement and verbalizes understanding     Expected Compliance: good  Recommendations  Pt is an appropriate candidate for surgery. Yes    Workflow: (Incomplete in Bold):  Psych and/or D+A Clearance: n/a  Blood Work: ordered   PCP letter: needs- contacted should be faxing to office   Surgeon Appt: done  EGD: 2/5/2025  Cardiac Risk Assessment with ECG:  3/6/2025  Sleep Studies: N/A  Nicotine test: n/a   Pre-Operative Program: 6/12- plans to come twice per month   NAFLD Score Calculated: Needs labs   Hepatology consult: n/a  Under Initial Office weight: yes     Evaluation / Monitoring  Dietitian to Monitor: Eating pattern as discussed Tolerance of nutrition prescription Body weight Lab values Physical activity Bowel pattern  Patient is taking her vitamins and minerals as recommended. Tracks her steps on her phone for movement, generally 3000 steps per day.  On days she uses the treadmill increases to 5000 or more per day  She  does the treadmill for 15 to 20 minutes several days per week. Patient has been  inconsistent with food logging but continues to be mindful concerning her food choices and measures her portions.   She has been decreasing her liquids with meals by using a smaller glass and taking sips. Recommending finishing her meal with fruit to add extra moisture at the end of the meal.    Overall, making changes in preparation for surgery.     Goals- continued   Eliminate sugar sweetened beverages, Food journal, Complete lession plans 1-6, Eat 3 meals per day, Eliminate mindless snacking, and track   Food log 1500 calories/ 75 grams protein   Practice not drinking before, during and after meals    Start with 15/15 - then 30/30 and progress to 30/60 rule  Continue to track steps > 2000 per day   Treadmill 3-4 times per week - track target heart rate.   Get lab work completed.     Time Spent:   30 minutes

## 2025-01-29 ENCOUNTER — TELEPHONE (OUTPATIENT)
Dept: BARIATRICS | Facility: CLINIC | Age: 40
End: 2025-01-29

## 2025-02-05 ENCOUNTER — ANESTHESIA (OUTPATIENT)
Dept: GASTROENTEROLOGY | Facility: HOSPITAL | Age: 40
End: 2025-02-05
Payer: COMMERCIAL

## 2025-02-05 ENCOUNTER — ANESTHESIA EVENT (OUTPATIENT)
Dept: GASTROENTEROLOGY | Facility: HOSPITAL | Age: 40
End: 2025-02-05
Payer: COMMERCIAL

## 2025-02-05 ENCOUNTER — HOSPITAL ENCOUNTER (OUTPATIENT)
Dept: GASTROENTEROLOGY | Facility: HOSPITAL | Age: 40
Setting detail: OUTPATIENT SURGERY
Discharge: HOME | End: 2025-02-05
Attending: SURGERY
Payer: COMMERCIAL

## 2025-02-05 VITALS
DIASTOLIC BLOOD PRESSURE: 73 MMHG | BODY MASS INDEX: 43.32 KG/M2 | OXYGEN SATURATION: 95 % | SYSTOLIC BLOOD PRESSURE: 113 MMHG | HEART RATE: 84 BPM | HEIGHT: 65 IN | WEIGHT: 260 LBS | RESPIRATION RATE: 20 BRPM | TEMPERATURE: 97.6 F

## 2025-02-05 DIAGNOSIS — Z98.84 BARIATRIC SURGERY STATUS: ICD-10-CM

## 2025-02-05 LAB
EXT PREGNANCY TEST URINE: NEGATIVE
EXT. CONTROL: NORMAL

## 2025-02-05 PROCEDURE — 43239 EGD BIOPSY SINGLE/MULTIPLE: CPT | Performed by: SURGERY

## 2025-02-05 PROCEDURE — 88305 TISSUE EXAM BY PATHOLOGIST: CPT | Performed by: PATHOLOGY

## 2025-02-05 PROCEDURE — 81025 URINE PREGNANCY TEST: CPT | Performed by: ANESTHESIOLOGY

## 2025-02-05 RX ORDER — SODIUM CHLORIDE, SODIUM LACTATE, POTASSIUM CHLORIDE, CALCIUM CHLORIDE 600; 310; 30; 20 MG/100ML; MG/100ML; MG/100ML; MG/100ML
125 INJECTION, SOLUTION INTRAVENOUS CONTINUOUS
Status: DISCONTINUED | OUTPATIENT
Start: 2025-02-05 | End: 2025-02-09 | Stop reason: HOSPADM

## 2025-02-05 RX ORDER — PROPOFOL 10 MG/ML
INJECTION, EMULSION INTRAVENOUS AS NEEDED
Status: DISCONTINUED | OUTPATIENT
Start: 2025-02-05 | End: 2025-02-05

## 2025-02-05 RX ORDER — SODIUM CHLORIDE, SODIUM LACTATE, POTASSIUM CHLORIDE, CALCIUM CHLORIDE 600; 310; 30; 20 MG/100ML; MG/100ML; MG/100ML; MG/100ML
125 INJECTION, SOLUTION INTRAVENOUS CONTINUOUS
Status: CANCELLED | OUTPATIENT
Start: 2025-02-05

## 2025-02-05 RX ORDER — LIDOCAINE HYDROCHLORIDE 20 MG/ML
INJECTION, SOLUTION EPIDURAL; INFILTRATION; INTRACAUDAL; PERINEURAL AS NEEDED
Status: DISCONTINUED | OUTPATIENT
Start: 2025-02-05 | End: 2025-02-05

## 2025-02-05 RX ADMIN — PROPOFOL 50 MG: 10 INJECTION, EMULSION INTRAVENOUS at 10:53

## 2025-02-05 RX ADMIN — SODIUM CHLORIDE, SODIUM LACTATE, POTASSIUM CHLORIDE, AND CALCIUM CHLORIDE: .6; .31; .03; .02 INJECTION, SOLUTION INTRAVENOUS at 10:41

## 2025-02-05 RX ADMIN — PROPOFOL 50 MG: 10 INJECTION, EMULSION INTRAVENOUS at 10:51

## 2025-02-05 RX ADMIN — PROPOFOL 200 MG: 10 INJECTION, EMULSION INTRAVENOUS at 10:50

## 2025-02-05 RX ADMIN — LIDOCAINE HYDROCHLORIDE 100 MG: 20 INJECTION, SOLUTION EPIDURAL; INFILTRATION; INTRACAUDAL at 10:50

## 2025-02-05 NOTE — ANESTHESIA PREPROCEDURE EVALUATION
Procedure:  EGD    Relevant Problems   GI/HEPATIC   (+) Acid reflux      NEURO/PSYCH   (+) Anxiety and depression      Other   (+) Obesity, Class III, BMI 40-49.9 (morbid obesity) (HCC)        Physical Exam    Airway    Mallampati score: II  TM Distance: >3 FB  Neck ROM: full     Dental   No notable dental hx     Cardiovascular  Cardiovascular exam normal    Pulmonary  Pulmonary exam normal     Other Findings  post-pubertal.      Anesthesia Plan  ASA Score- 3     Anesthesia Type- IV sedation with anesthesia with ASA Monitors.         Additional Monitors:     Airway Plan:            Plan Factors-Exercise tolerance (METS): >4 METS.    Chart reviewed.   Existing labs reviewed. Patient summary reviewed.    Patient is not a current smoker.              Induction- intravenous.    Postoperative Plan-     Perioperative Resuscitation Plan - Level 1 - Full Code.       Informed Consent- Anesthetic plan and risks discussed with patient and spouse.  I personally reviewed this patient with the CRNA. Discussed and agreed on the Anesthesia Plan with the CRNA..      NPO Status:  No vitals data found for the desired time range.

## 2025-02-05 NOTE — H&P
"This is a 39 y.o. female with a history of morbid obesity and Body mass index is 43.27 kg/m².  Here for an EGD to evaluate the anatomy of the GI tract.    Physical Exam    /79   Pulse 82   Temp 97.5 °F (36.4 °C) (Temporal)   Resp 18   Ht 5' 5\" (1.651 m)   Wt 118 kg (260 lb)   SpO2 97%   BMI 43.27 kg/m²    AAOx3  RRR  CTA B  Abdomen obese. Benign.      A/P:    This is a 39 y.o. female with a history of morbid obesity and Body mass index is 43.27 kg/m²..    Will proceed with the EGD and biopsies.      Leon Patricio MD  02/05/25  10:43 AM  "

## 2025-02-05 NOTE — ANESTHESIA POSTPROCEDURE EVALUATION
Post-Op Assessment Note    CV Status:  Stable    Pain management: adequate       Mental Status:  Alert and awake   Hydration Status:  Euvolemic   PONV Controlled:  Controlled   Airway Patency:  Patent     Post Op Vitals Reviewed: Yes    No anethesia notable event occurred.    Staff: Anesthesiologist           Last Filed PACU Vitals:  Vitals Value Taken Time   Temp 97.6 °F (36.4 °C) 02/05/25 1100   Pulse 101 02/05/25 1100   /54 02/05/25 1100   Resp 20 02/05/25 1100   SpO2 96 % 02/05/25 1100

## 2025-02-09 LAB
ALBUMIN SERPL-MCNC: 4.6 G/DL (ref 3.6–5.1)
ALBUMIN/GLOB SERPL: 1.8 (CALC) (ref 1–2.5)
ALP SERPL-CCNC: 75 U/L (ref 31–125)
ALT SERPL-CCNC: 19 U/L (ref 6–29)
AST SERPL-CCNC: 14 U/L (ref 10–30)
BASOPHILS # BLD AUTO: 30 CELLS/UL (ref 0–200)
BASOPHILS NFR BLD AUTO: 0.5 %
BILIRUB SERPL-MCNC: 0.8 MG/DL (ref 0.2–1.2)
BUN SERPL-MCNC: 16 MG/DL (ref 7–25)
BUN/CREAT SERPL: NORMAL (CALC) (ref 6–22)
CALCIUM SERPL-MCNC: 9.6 MG/DL (ref 8.6–10.2)
CHLORIDE SERPL-SCNC: 102 MMOL/L (ref 98–110)
CHOLEST SERPL-MCNC: 195 MG/DL
CHOLEST/HDLC SERPL: 3.3 (CALC)
CO2 SERPL-SCNC: 28 MMOL/L (ref 20–32)
CREAT SERPL-MCNC: 0.78 MG/DL (ref 0.5–0.97)
EOSINOPHIL # BLD AUTO: 108 CELLS/UL (ref 15–500)
EOSINOPHIL NFR BLD AUTO: 1.8 %
ERYTHROCYTE [DISTWIDTH] IN BLOOD BY AUTOMATED COUNT: 13.3 % (ref 11–15)
GFR/BSA.PRED SERPLBLD CYS-BASED-ARV: 100 ML/MIN/1.73M2
GLOBULIN SER CALC-MCNC: 2.6 G/DL (CALC) (ref 1.9–3.7)
GLUCOSE SERPL-MCNC: 80 MG/DL (ref 65–99)
HCT VFR BLD AUTO: 42.6 % (ref 35–45)
HDLC SERPL-MCNC: 59 MG/DL
HGB BLD-MCNC: 14.2 G/DL (ref 11.7–15.5)
LDLC SERPL CALC-MCNC: 117 MG/DL (CALC)
LYMPHOCYTES # BLD AUTO: 2196 CELLS/UL (ref 850–3900)
LYMPHOCYTES NFR BLD AUTO: 36.6 %
MCH RBC QN AUTO: 27.5 PG (ref 27–33)
MCHC RBC AUTO-ENTMCNC: 33.3 G/DL (ref 32–36)
MCV RBC AUTO: 82.4 FL (ref 80–100)
MONOCYTES # BLD AUTO: 474 CELLS/UL (ref 200–950)
MONOCYTES NFR BLD AUTO: 7.9 %
NEUTROPHILS # BLD AUTO: 3192 CELLS/UL (ref 1500–7800)
NEUTROPHILS NFR BLD AUTO: 53.2 %
NONHDLC SERPL-MCNC: 136 MG/DL (CALC)
PLATELET # BLD AUTO: 329 THOUSAND/UL (ref 140–400)
PMV BLD REES-ECKER: 9.7 FL (ref 7.5–12.5)
POTASSIUM SERPL-SCNC: 4.4 MMOL/L (ref 3.5–5.3)
PROT SERPL-MCNC: 7.2 G/DL (ref 6.1–8.1)
RBC # BLD AUTO: 5.17 MILLION/UL (ref 3.8–5.1)
SODIUM SERPL-SCNC: 138 MMOL/L (ref 135–146)
TRIGL SERPL-MCNC: 93 MG/DL
TSH SERPL-ACNC: 2.54 MIU/L
WBC # BLD AUTO: 6 THOUSAND/UL (ref 3.8–10.8)

## 2025-02-10 ENCOUNTER — RESULTS FOLLOW-UP (OUTPATIENT)
Dept: OTHER | Facility: HOSPITAL | Age: 40
End: 2025-02-10

## 2025-02-13 ENCOUNTER — TELEPHONE (OUTPATIENT)
Dept: BARIATRICS | Facility: CLINIC | Age: 40
End: 2025-02-13

## 2025-02-13 NOTE — TELEPHONE ENCOUNTER
Called to cancel 2/14 appointment due to illness, patient has apt 2/17 but wcb if needs to reschedule if not feeling well.  Pt must have monthly appointments with RD or SW; at this time was scheduling weekly apts to get program completed earlier.

## 2025-02-14 NOTE — PROGRESS NOTES
Bariatric Nutrition Assessment Note    Type of surgery    Preop- sleeve   Surgery Date: Expected date  May/ June 2025  Pt has 12 session program requirement   Today is 7/12 of program requirement   Surgeon: Rahat Surgeons: Dr. Patricio     Nutrition Assessment   Brittney Una  39 y.o.  female     Wt with BMI of 25: 150.1 lbs   Pre-Op Excess Wt: 109.1 lbs   Wt 114 kg (251 lb 6.4 oz)   BMI 41.84 kg/m²    Pt has maintained her weight with 2# over the past 3 month       Wt Readings from Last 3 Encounters:   02/05/25 118 kg (260 lb)   01/20/25 114 kg (250 lb 9.6 oz)   01/10/25 113 kg (249 lb 8 oz)        Saeid Higuera Equation:    [id number]  Weight Maintenance 2195  Estimated calories for weight loss 3155-1283 ( 1-2# per wk wt loss - sedentary )  Estimated protein needs  grams per day (1.0-1.5 gms/kg IBW )   Estimated fluid needs 68-79 oz per day (30-35 ml/kg IBW )      NAFLD Fibrosis Score is: -2.822    NAFLD Score Correlated Fibrosis Severity   <-1.455 F0-F2   -1.455-0.676 Indeterminate Score   >0.676 F3-F4   **Fibrosis Severity Scale: F0 = no fibrosis; F1= mild fibrosis; F2 = moderate fibrosis; F3 = severe fibrosis; F4 = cirrhosis    NAFLD Score Component Values:  Component Value Date   Age: 39 y.o.     BMI: 43.27 kg/m²    IFG or DM: No    AST: 14 U/L 2/8/2025   ALT: 19 U/L 2/8/2025   Platelet: 329 Thousands/uL 2/8/2025   Albumin: 4.6 g/dL 2/8/2025        Weight History   Onset of Obesity: Adulthood   Family history of obesity: Yes- grandmother and great aunts- one had gastric surgery on paternal side  Wt Loss Attempts: Commercial Programs (Weight Watchers, Louise Sean, etc.)  Fasting  High Protein/Low CHO diets (Atkins, [employer], etc.)  Nutrition Counseling with RD  Patient has tried the above for 6 months or more with insufficient weight loss or weight regain, which is why patient has requested to be evaluated for weight loss surgery today  Maximum Wt Lost: 40# with the Atkins       Review of History  and Medications   No past medical history on file.  Past Surgical History:   Procedure Laterality Date    GALLBLADDER SURGERY  05/30/2023     Social History     Socioeconomic History    Marital status: /Civil Union     Spouse name: Not on file    Number of children: Not on file    Years of education: Not on file    Highest education level: Not on file   Occupational History    Not on file   Tobacco Use    Smoking status: Never     Passive exposure: Past    Smokeless tobacco: Never   Vaping Use    Vaping status: Never Used   Substance and Sexual Activity    Alcohol use: Never    Drug use: Never    Sexual activity: Yes     Partners: Male   Other Topics Concern    Not on file   Social History Narrative    Not on file     Social Drivers of Health     Financial Resource Strain: Not on file   Food Insecurity: Not on file   Transportation Needs: Not on file   Physical Activity: Not on file   Stress: Not on file   Social Connections: Not on file   Intimate Partner Violence: Not on file   Housing Stability: Not on file       Current Outpatient Medications:     Levonorgestrel (MIRENA) 20 MCG/DAY IUD, 1 each by Intrauterine Device route Once every 8 years, Disp: , Rfl:     omeprazole (PriLOSEC) 20 mg delayed release capsule, Take 20 mg by mouth daily, Disp: , Rfl:     sertraline (ZOLOFT) 100 mg tablet, Take 100 mg by mouth daily, Disp: , Rfl:       Food Intake and Lifestyle Assessment   Food Intake Assessment completed via usual diet recall  Breakfast: Coffee at [employer] - flavored with cream   Sometimes makes coffee at home with sugar   7 to 7:30 am Usually eats on way to work, [employer] breakfast biscusits to protein shake( [employer] brand Equ[employer] )  to fig bars or do nut  Quick and easy   Snack: 0   Lunch: 11 am - packs a lunch   [place] or dinner leftovers or can of soup   Snack: - Cheeze Prateek or pretzels or poptart or fig bars or PB crackers 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   Dinner: 5 to 6 pm   Protein , vegetable  and starch ( rice or potatoes or noodles )   Likes chicken , pasta with sausage or meatballs meatloaf , occasional fish ( white )   Snack: bowl of cereal ( honey nut cherrios, raison bran crunch ) with LF milk , or oreos or cheese and cracker snack mix or sunchips   Beverage intake: 2% milk, diet soda, coffee/tea, and diet iced tea, small amount water   Protein supplement: Equate protein shake once per week to save time   Estimated protein intake per day: ~ 50-60 grams per day   Estimated fluid intake per day: diet soda - 1-2 cans ( 24 oz ) diet iced ( 2 to 3 20 oz glasses ) 10 to 20 oz   ~24 oz of coffee   Meals eaten away from home: take twice per week, 1-2 times per week for breakfast   Typical meal pattern: 2-3 meals per day and 1-2 snacks per day  Eating Behaviors: Consumption of high calorie/ high fat foods, Consumption of high calorie beverages, Large portion sizes, Mindless eating, and Emotional eating  Sugar sweet things are a crutch emotionally   Food allergies or intolerances:   Allergies   Allergen Reactions    Penicillin G Rash     Cultural or Mandaen considerations: N/A    Physical Assessment  Physical Activity  Types of exercise: activities of daily living   Current physical limitations: time is limiting factor     Psychosocial Assessment   Support systems: spouse  Socioeconomic factors: works as a teach, 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       Patient educated and handouts provided.  Surgical changes to stomach / GI  Capacity of post-surgery stomach  Diet progression  Adequate hydration  Sugar and  "fat restriction to decrease \"dumping syndrome\"  Fat restriction to decrease steatorrhea  Expected weight loss  Weight loss plateaus/ possibility of weight regain  Exercise  Suggestions for pre-op diet  Nutrition considerations after surgery  Protein supplements  Meal planning and preparation  Appropriate carbohydrate, protein, and fat intake, and food/fluid choices to maximize safe weight loss, nutrient intake, and tolerance   Dietary and lifestyle changes  Possible problems with poor eating habits  Intuitive eating  Techniques for self monitoring and keeping daily food journal  Potential for food intolerance after surgery, and ways to deal with them including: lactose intolerance, nausea, reflux, vomiting, diarrhea, food intolerance, appetite changes, gas  Vitamin / Mineral supplementation of Multivitamin with minerals, Calcium, Vitamin B12, Iron, and Vitamin D    Patient is not currently pregnant and doesn't desire to become pregnant a minimum of one year post-op- pt has Mirena     Education provided to: patient    present and supportive     Barriers to learning: No barriers identified    Readiness to change: preparation/action     Prior research on procedure: discussed with provider    Comprehension: needs reinforcement and verbalizes understanding     Expected Compliance: good  Recommendations  Pt is an appropriate candidate for surgery. Yes    Workflow: (Incomplete in Bold):  Psych and/or D+A Clearance: n/a  Blood Work: 2/8/2025  PCP letter: 1/20/2025  Surgeon Appt: done  EGD: 2/5/2025  Cardiac Risk Assessment with ECG:  3/6/2025  Sleep Studies: N/A  Nicotine test: n/a   Pre-Operative Program: 7/12- plans to come twice per month   NAFLD Score Calculated:yes  Hepatology consult: n/a  Under Initial Office weight: yes     Evaluation / Monitoring  Dietitian to Monitor: Eating pattern as discussed Tolerance of nutrition prescription Body weight Lab values Physical activity Bowel pattern  Patient is taking her " vitamins and minerals as recommended. Tracks her steps on her phone for movement, generally 7401-8663 steps per day.   She also does the treadmill for 15 to 20 minutes several days per week.  She dropped off food logging over the holidays and is having difficulty getting back ont rack.  She reports that she is being mindful concerning her food choices and including protein at each meal She has been decreasing her liquids with meals by using a smaller glass and taking sips but still struggling with the 30/60 rule   Overall, making changes in preparation for surgery.     Goals- continued   Eliminate sugar sweetened beverages, Food journal, Complete lession plans 1-6, Eat 3 meals per day, Eliminate mindless snacking, and track   Food log 1500 calories/ 75 grams protein  Practice not drinking before, during and after meals    Start with 15/15 - then 30/30 and progress to 30/60 rule  Continue to track steps > 2000 per day   Treadmill 3-4 times per week - track target heart rate.   Time meals to take 15-20 minutes, chew food well     Time Spent:   30 minutes

## 2025-02-17 ENCOUNTER — CLINICAL SUPPORT (OUTPATIENT)
Dept: BARIATRICS | Facility: CLINIC | Age: 40
End: 2025-02-17

## 2025-02-17 VITALS — WEIGHT: 251.4 LBS | BODY MASS INDEX: 41.84 KG/M2

## 2025-02-17 DIAGNOSIS — E66.01 OBESITY, CLASS III, BMI 40-49.9 (MORBID OBESITY) (HCC): Primary | ICD-10-CM

## 2025-02-17 PROCEDURE — RECHECK: Performed by: DIETITIAN, REGISTERED

## 2025-03-06 ENCOUNTER — CLINICAL SUPPORT (OUTPATIENT)
Dept: BARIATRICS | Facility: CLINIC | Age: 40
End: 2025-03-06

## 2025-03-06 ENCOUNTER — CONSULT (OUTPATIENT)
Dept: CARDIOLOGY CLINIC | Facility: CLINIC | Age: 40
End: 2025-03-06
Payer: COMMERCIAL

## 2025-03-06 VITALS
SYSTOLIC BLOOD PRESSURE: 120 MMHG | HEART RATE: 87 BPM | DIASTOLIC BLOOD PRESSURE: 80 MMHG | BODY MASS INDEX: 42.22 KG/M2 | HEIGHT: 65 IN | WEIGHT: 253.4 LBS

## 2025-03-06 VITALS — WEIGHT: 252.4 LBS | BODY MASS INDEX: 42 KG/M2

## 2025-03-06 DIAGNOSIS — Z01.818 PRE-OP TESTING: Primary | ICD-10-CM

## 2025-03-06 DIAGNOSIS — E66.01 OBESITY, CLASS III, BMI 40-49.9 (MORBID OBESITY) (HCC): Primary | ICD-10-CM

## 2025-03-06 DIAGNOSIS — E66.01 MORBID (SEVERE) OBESITY DUE TO EXCESS CALORIES (HCC): ICD-10-CM

## 2025-03-06 PROCEDURE — RECHECK

## 2025-03-06 PROCEDURE — 93000 ELECTROCARDIOGRAM COMPLETE: CPT | Performed by: INTERNAL MEDICINE

## 2025-03-06 PROCEDURE — 99204 OFFICE O/P NEW MOD 45 MIN: CPT | Performed by: INTERNAL MEDICINE

## 2025-03-06 NOTE — PROGRESS NOTES
Bariatric Behavioral Health follow up     8 / 12 weight check  Surgeon:  Dr Patricio     Starting weight:  252 #  Today's weight: 252.4  #    Stress at work.  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  Emotional Eating around the situational stress.    Trying to stage the home with healthier foods for the household including children . Packing snacks to work.  Protein shake for breakfast.    No plating, encouraged to start.  Eating slow and chewing down food. Working on 30/60 rule.     Movement:  some walking for steps.   Walking and standing at work.      Workflow: reviewed with Brittney  Psych and/or D+A additional documentation: n/a  PCP Letter: 1/20/2025  Support Group: RECOMMENDED  Surgeon Appt.: 7/2/2024  EGD : 2/5/25  Cardiac Risk Assessment: 3/6/25  Sleep Studies: N/A  Bloodwork: ordered

## 2025-03-06 NOTE — PROGRESS NOTES
Outpatient Consultation - General Cardiology   Brittney Heart 39 y.o. female   MRN: [id number]  Encounter: [id number]      PCP: BETH Hinkle      History of Present Illness   Physician Requesting Consult: Consults   Reason for Consult / Principal Problem: Preoperatice cardiac risk assesment    HPI: Brittney Heart is a 39 y.o. year old female, who was referred to [facility] outpatient Cardiology by surgeon for preop risk stratification for bariatric surgery. She has a history of Anxiety/depression, obesity, GERD. She is planned for sleve gastrectomy this summer. She is currently working as a . She is not very active at baseline but denies every having chest pain with activity. She is getting over a long term cough after pneumonia and sometimes has pain with coughing. She has mild SOB since the pneumonia and uses an inhaler for asthma like symptoms. She is able to walk up 2 flights of stairs and around a block without stopping.    Family History: positive family cardiac history, grandparents and multiple aunts with Mis.   Tobacco use: none  Alcohol use: none  Drug use: none  METS: can perform >4 mets  Bleeding: no issues with bleeding in the past  History of anesthesia: yes with gall bladder surgery, no issues with anesthesia in the past    Reviewed Prior Cardiac studies:  ECG 3/6/25: normal sinus rhythm, normal ECG.    Review of Systems  Review of system was conducted and was negative except for as stated in the HPI.      Historical Information   No past medical history on file.  Past Surgical History:   Procedure Laterality Date    GALLBLADDER SURGERY  05/30/2023     Social History     Substance and Sexual Activity   Alcohol Use Never     Social History     Substance and Sexual Activity   Drug Use Never     Social History     Tobacco Use   Smoking Status Never    Passive exposure: Past   Smokeless Tobacco Never       Meds/Allergies   Home Medications:   Current Outpatient  "Medications:     Levonorgestrel (MIRENA) 20 MCG/DAY IUD, 1 each by Intrauterine Device route Once every 8 years, Disp: , Rfl:     omeprazole (PriLOSEC) 20 mg delayed release capsule, Take 20 mg by mouth daily, Disp: , Rfl:     sertraline (ZOLOFT) 100 mg tablet, Take 100 mg by mouth daily, Disp: , Rfl:     Allergies   Allergen Reactions    Penicillin G Rash         Objective   Vitals: Blood pressure 120/80, pulse 87, height 5' 5\" (1.651 m), weight 115 kg (253 lb 6.4 oz).      Physical Exam  GEN: Brittney Heart appears well, alert and oriented x 3, pleasant and cooperative   HEENT:  Normocephalic, atraumatic, anicteric, moist mucous membranes  NECK: No JVD or carotid bruits   HEART: regular rhythm, regular rate, normal S1 and S2, no murmurs, clicks, gallops or rubs   LUNGS: Clear to auscultation bilaterally; no wheezes, rales, or rhonchi; respiration nonlabored   ABDOMEN:  Normoactive bowel sounds, soft, no tenderness, no distention  EXTREMITIES: peripheral pulses palpable; no edema  NEURO: no gross focal findings; cranial nerves grossly intact   SKIN:  Dry, intact, warm to touch    Lab Results: I have personally reviewed pertinent lab results.    No results found for: \"HSTNI0\", \"HSTNI2\", \"HSTNI4\", \"HSTNI\"  No results found for: \"NTBNP\"  Lab Results   Component Value Date    TRIG 93 02/08/2025    HDL 59 02/08/2025    LDLCALC 117 (H) 02/08/2025     Lab Results   Component Value Date    K 4.4 02/08/2025    CO2 28 02/08/2025     02/08/2025    BUN 16 02/08/2025    CREATININE 0.78 02/08/2025    ALT 19 02/08/2025    AST 14 02/08/2025     Lab Results   Component Value Date    WBC 6.0 02/08/2025    HGB 14.2 02/08/2025    HCT 42.6 02/08/2025    MCV 82.4 02/08/2025     02/08/2025     No results found for: \"INR\"  No results found for: \"HGBA1C\"     Imaging: Results Review Statement: No pertinent imaging studies reviewed.      EKG:   Date: 3/6/25  Interpretation: normal sinus rhythm      Previous STRESS TEST:  No " results found for this or any previous visit.     No results found for this or any previous visit.    No results found for this or any previous visit.      Previous Cath/PCI:  No results found for this or any previous visit.    No results found for this or any previous visit.    No results found for this or any previous visit.      ECHO:  No results found for this or any previous visit.    No results found for this or any previous visit.      YAJAIRA:  No results found for this or any previous visit.    No results found for this or any previous visit.      CMR:  No results found for this or any previous visit.    No results found for this or any previous visit.    No results found for this or any previous visit.      HOLTER  No results found for this or any previous visit.    No results found for this or any previous visit.      The ASCVD Risk score (Micki DK, et al., 2019) failed to calculate for the following reasons:    The 2019 ASCVD risk score is only valid for ages 40 to 79      Assessment & Plan     Assessment/Plan:    Preoperative cardiac risk assessment  The is at low cardiac risk or her intra-abdominal surgery. She has positive family history and obesity as positive risk factors however she is very functional at baseline. ECG reviewed in the office today and is normal sinus rhythm, overall normal ECG. Preoperative risk factors are well controlled.  - no further cardiac workup prior to surgery  - if any symptoms or concern arise from this time until surgery will see her back in the office otherwise no contraindication to sleeve gastrectomy at this time.  - she endorses significant snoring without noted apneic episodes, could consider sleep study to test for JIL, not required to be obtained before surgery      Case discussed and reviewed with Dr. Garay who agrees with my assessment and plan.    Thank you for involving us in the care of your patient.      Froylan Henley,   Cardiology Fellow    PGY-5      ==========================================================================================    Epic/ Allscripts/Care Everywhere records reviewed    ** Please Note: Fluency DirectDictation voice to text software may have been used in the creation of this document. **

## 2025-03-13 ENCOUNTER — CLINICAL SUPPORT (OUTPATIENT)
Dept: BARIATRICS | Facility: CLINIC | Age: 40
End: 2025-03-13

## 2025-03-13 VITALS — BODY MASS INDEX: 42.45 KG/M2 | WEIGHT: 255.1 LBS

## 2025-03-13 DIAGNOSIS — E66.01 OBESITY, CLASS III, BMI 40-49.9 (MORBID OBESITY) (HCC): Primary | ICD-10-CM

## 2025-03-13 PROCEDURE — RECHECK

## 2025-03-13 NOTE — PROGRESS NOTES
Behavioral Health Follow Up Note:      9 / 12  Weight Check:  Surgeon: Dr. Leon Patricio-SLEEVE     Starting weight 252   # (discussed need to be (at minimum) at or below starting weight at time case is submitted to insurance)  Today's weight 255.1   #    Surgery month:  APR-MAY (wants surgery after June 15th due to trip)    Mental health and wellness - Patient is appropriately making changes based off the information contained in the bariatric manual.  Patient is modifying behaviors and demonstrating adapting to change.  Job stress caused bad habits but is now back on track. Had job interview today. Mood is improving with time change-staying light outside later. Family is pretty good but getting over recent GI bug. Encouraging family to change habits as well to be more active and eat better. S/O interested in bariatric process.     Eating behaviors - focusing on head vs physical hunger, hunger/full cues. Dicussed ways to combat night time eating. Consuming 3 meals daily plus 2 snacks. Not tracking food intake on phone-encouraged to start logging 2 days a week.       Activity -  walking on trails with family     Progress toward program requirements    Workflow: reviewed with Brittney  Psych and/or D+A additional documentation: n/a  PCP Letter: 1/20/2025  Support Group: RECOMMENDED  Surgeon Appt.: 7/2/2024  EGD : 2/5/25  Cardiac Risk Assessment: 3/6/25  Sleep Studies: N/A  Bloodwork: 2/8/25     Weight check 10/12 scheduled with SW.     Goals:  Continue following the bariatric recommendations to prepare for bariatric surgery

## 2025-04-02 NOTE — PROGRESS NOTES
Behavioral Health Follow Up Note:      10 / 12  Weight Check:  Surgeon: Dr. Leon Patricio-SLEEVE     Starting weight 252.0  # (discussed need to be (at minimum) at or below starting weight at time case is submitted to insurance)  Today's weight 256.6   #      Surgery month:  APR-MAY (wants surgery after June 15th due to trip)     Mental health and wellness - Patient is appropriately making changes based off the information contained in the bariatric manual.  Patient is modifying behaviors and demonstrating adapting to change.   Searching for new job.     Eating behaviors - working on coping with desire to be full-want satisfaction. Practicing mindful eating. Trying to find healthier pasta options-eats twice monthly. 3 meals plus 2-3 snacks daily. Not racking food.     Activity -  wants to start dancing or using video games to add mindful movement.     Progress toward program requirements    Workflow: reviewed with Brittney  Psych and/or D+A additional documentation: n/a; needs final SW assessment  PCP Letter: 1/20/2025  Support Group: RECOMMENDED  Surgeon Appt.: 7/2/2024; needs final assessment  EGD : 2/5/25  Cardiac Risk Assessment: 3/6/25  Sleep Studies: N/A  Bloodwork: 2/8/25     Weight check 11 / 12 scheduled with RD.     Goals:    1) track food intake 2 days/week-review with RD at next appt   2) mindful movement- 2 days a week, minimum 15 minutes using Just Dance   3) prioritize self care 10 minutes daily  4) Continue following the bariatric recommendations to prepare for bariatric surgery

## 2025-04-04 ENCOUNTER — CLINICAL SUPPORT (OUTPATIENT)
Dept: BARIATRICS | Facility: CLINIC | Age: 40
End: 2025-04-04

## 2025-04-04 VITALS — BODY MASS INDEX: 42.7 KG/M2 | WEIGHT: 256.6 LBS

## 2025-04-04 DIAGNOSIS — E66.01 OBESITY, CLASS III, BMI 40-49.9 (MORBID OBESITY) (HCC): Primary | ICD-10-CM

## 2025-04-04 PROCEDURE — RECHECK

## 2025-04-21 ENCOUNTER — CLINICAL SUPPORT (OUTPATIENT)
Dept: BARIATRICS | Facility: CLINIC | Age: 40
End: 2025-04-21

## 2025-04-21 DIAGNOSIS — E66.813 OBESITY, CLASS III, BMI 40-49.9 (MORBID OBESITY): Primary | ICD-10-CM

## 2025-04-21 PROCEDURE — RECHECK: Performed by: DIETITIAN, REGISTERED

## 2025-04-21 NOTE — PROGRESS NOTES
Bariatric Nutrition Assessment Note    Type of surgery    Preop- sleeve   Surgery Date: Expected date  May/ June 2025  Pt has 12 session program requirement   Today is 11/12 of program requirement   Surgeon: Rahat Surgeons: Dr. Patricio     Nutrition Assessment   Brittney Una  39 y.o.  female     Wt with BMI of 25: 150.1 lbs   Pre-Op Excess Wt: 109.1 lbs       Wt Readings from Last 3 Encounters:   04/04/25 116 kg (256 lb 9.6 oz)   03/13/25 116 kg (255 lb 1.6 oz)   03/06/25 115 kg (253 lb 6.4 oz)        Saeid Higuera Equation:    [id number]  Weight Maintenance 2195  Estimated calories for weight loss 4805-9129 ( 1-2# per wk wt loss - sedentary )  Estimated protein needs  grams per day (1.0-1.5 gms/kg IBW )   Estimated fluid needs 68-79 oz per day (30-35 ml/kg IBW )      NAFLD Fibrosis Score is: -2.876    NAFLD Score Correlated Fibrosis Severity   <-1.455 F0-F2   -1.455-0.676 Indeterminate Score   >0.676 F3-F4   **Fibrosis Severity Scale: F0 = no fibrosis; F1= mild fibrosis; F2 = moderate fibrosis; F3 = severe fibrosis; F4 = cirrhosis    NAFLD Score Component Values:  Component Value Date   Age: 39 y.o.     BMI: 42.7 kg/m²    IFG or DM: No    AST: 14 U/L 2/8/2025   ALT: 19 U/L 2/8/2025   Platelet: 329 Thousands/uL 2/8/2025   Albumin: 4.6 g/dL 2/8/2025        Weight History   Onset of Obesity: Adulthood   Family history of obesity: Yes- grandmother and great aunts- one had gastric surgery on paternal side  Wt Loss Attempts: Commercial Programs (Weight Watchers, Louise Sean, etc.)  Fasting  High Protein/Low CHO diets (Atkins, [employer], etc.)  Nutrition Counseling with RD  Patient has tried the above for 6 months or more with insufficient weight loss or weight regain, which is why patient has requested to be evaluated for weight loss surgery today  Maximum Wt Lost: 40# with the Atkins       Review of History and Medications   No past medical history on file.  Past Surgical History:   Procedure Laterality  Date    GALLBLADDER SURGERY  05/30/2023     Social History     Socioeconomic History    Marital status: /Civil Union     Spouse name: Not on file    Number of children: Not on file    Years of education: Not on file    Highest education level: Not on file   Occupational History    Not on file   Tobacco Use    Smoking status: Never     Passive exposure: Past    Smokeless tobacco: Never   Vaping Use    Vaping status: Never Used   Substance and Sexual Activity    Alcohol use: Never    Drug use: Never    Sexual activity: Yes     Partners: Male   Other Topics Concern    Not on file   Social History Narrative    Not on file     Social Drivers of Health     Financial Resource Strain: Not on file   Food Insecurity: Not on file   Transportation Needs: Not on file   Physical Activity: Not on file   Stress: Not on file   Social Connections: Not on file   Intimate Partner Violence: Not on file   Housing Stability: Not on file       Current Outpatient Medications:     Levonorgestrel (MIRENA) 20 MCG/DAY IUD, 1 each by Intrauterine Device route Once every 8 years, Disp: , Rfl:     omeprazole (PriLOSEC) 20 mg delayed release capsule, Take 20 mg by mouth daily, Disp: , Rfl:     sertraline (ZOLOFT) 100 mg tablet, Take 100 mg by mouth daily, Disp: , Rfl:       Food Intake and Lifestyle Assessment   Food Intake Assessment completed via usual diet recall  Breakfast: Coffee at [employer] - flavored with cream   Sometimes makes coffee at home with sugar   7 to 7:30 am Usually eats on way to work, [place] breakfast biscusits to protein shake( [employer] brand Equate )  to fig bars or do nut  Quick and easy   Snack: 0   Lunch: 11 am - packs a lunch   [place] or dinner leftovers or can of soup   Snack: - Cheeze Prateek or pretzels or poptart or fig bars or PB crackers 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   Dinner: 5 to 6 pm   Protein , vegetable and starch ( rice or potatoes or noodles )   Likes chicken , pasta with sausage or meatballs  meatloaf , occasional fish ( white )   Snack: bowl of cereal ( honey nut cherrios, raison bran crunch ) with LF milk , or oreos or cheese and cracker snack mix or sunchips   Beverage intake: 2% milk, diet soda, coffee/tea, and diet iced tea, small amount water   Protein supplement: Equate protein shake once per week to save time   Estimated protein intake per day: ~ 50-60 grams per day   Estimated fluid intake per day: diet soda - 1-2 cans ( 24 oz-this has decreased to occasional )  ) diet iced ( 2 to 3 20 oz glasses- decreased to 2 glasses per day  ) 10 to 20 oz   ~24 oz of coffee - reduced to 12 oz per day  and has increased water to 30 oz per day   Meals eaten away from home: take twice per week, 1-2 times per week for breakfast   Typical meal pattern: 2-3 meals per day and 1-2 snacks per day  Eating Behaviors: Consumption of high calorie/ high fat foods, Consumption of high calorie beverages, Large portion sizes, Mindless eating, and Emotional eating  Sugar sweet things are a crutch emotionally   Food allergies or intolerances:   Allergies   Allergen Reactions    Penicillin G Rash     Cultural or Worship considerations: N/A    Physical Assessment  Physical Activity  Types of exercise: activities of daily living   Current physical limitations: time is limiting factor     Psychosocial Assessment   Support systems: spouse  Socioeconomic factors: works as a teach, 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       Patient educated and handouts provided.  Surgical changes to stomach / GI  Capacity of post-surgery  "stomach  Diet progression  Adequate hydration  Sugar and fat restriction to decrease \"dumping syndrome\"  Fat restriction to decrease steatorrhea  Expected weight loss  Weight loss plateaus/ possibility of weight regain  Exercise  Suggestions for pre-op diet  Nutrition considerations after surgery  Protein supplements  Meal planning and preparation  Appropriate carbohydrate, protein, and fat intake, and food/fluid choices to maximize safe weight loss, nutrient intake, and tolerance   Dietary and lifestyle changes  Possible problems with poor eating habits  Intuitive eating  Techniques for self monitoring and keeping daily food journal  Potential for food intolerance after surgery, and ways to deal with them including: lactose intolerance, nausea, reflux, vomiting, diarrhea, food intolerance, appetite changes, gas  Vitamin / Mineral supplementation of Multivitamin with minerals, Calcium, Vitamin B12, Iron, and Vitamin D    Patient is not currently pregnant and doesn't desire to become pregnant a minimum of one year post-op- pt has Mirena     Education provided to: patient    present and supportive     Barriers to learning: No barriers identified    Readiness to change: preparation/action     Prior research on procedure: discussed with provider    Comprehension: needs reinforcement and verbalizes understanding     Expected Compliance: good  Recommendations  Pt is an appropriate candidate for surgery. Yes    Workflow: (Incomplete in Bold):  Psych and/or D+A Clearance: n/a  Blood Work: 2/8/2025  PCP letter: 1/20/2025  Surgeon Appt: done  Final Assessment : 5/15/2025  EGD: 2/5/2025  Cardiac Risk Assessment with ECG:  3/6/2025  Sleep Studies: N/A  Nicotine test: n/a   Pre-Operative Program: 11/12- plans to come twice per month   NAFLD Score Calculated:yes  Hepatology consult: n/a  Under Initial Office weight: yes     Evaluation / Monitoring  Dietitian to Monitor: Eating pattern as discussed Tolerance of nutrition " prescription Body weight Lab values Physical activity Bowel pattern  Patient is taking her vitamins and minerals as recommended. She has been food logging a minimum of twice per week, averaging 1400 calories or less per day, 80-85 grams protein.  She has reduced her diet coke intake and increase her water intake to 30 oz per day, plus 40 oz of diet iced tea for a total of 70 oz of calorie free beverages per day.  She has reduced her coffee intake to one cup per day.  She has been moving more with her lifestyle, Going to the park with her children and walking with her 11 year old.  Behavioral health had set a goal of 10 minutes per day for self care.  She is reading at least 10 minutes per day for her own self care.  She has been practicing the 30/30 rule, still struggling with not drinking with meals.  Overall, making lifestyle changes in preparation for surgery.     Goals  Eliminate sugar sweetened beverages, Food journal, Complete lession plans 1-6, Eat 3 meals per day, Eliminate mindless snacking, and track   Food log 1500 calories/ 75 grams protein at least twice per week   Practice not drinking before, during and after meals    Start with 15/15 - then 30/30 and progress to 30/60 rule  Continue to be more active with walking twice per week   Time meals to take 15-20 minutes, chew food well     Time Spent:   30 minutes

## 2025-05-05 NOTE — PROGRESS NOTES
"UPDATED Bariatric Behavioral Health Evaluation      weight check  Surgeon:  Dr Patricio-Leaning towards [facility] due to concerns about GERD  Final surgeon consult 25  Workflow appears completed-needs to be at starting weight to submit to insurance      Starting weight: 252 #  Today's weight: 259.8 # (up 7.8#)     Presenting Problem:   .Brittney Heart  is a 39 y.o.   female    :  1985   Patient presented with overall concerns of obesity.  Stated that weight has impacted quality of life and has current future concerns with lack of mobility, movement, chronic pain, and overall health.  Has attempted various weight loss plans in the past including exercise, and healthy eating    Patient is Interested in exploring bariatric surgery.as an option for  weight loss goals.       Is the patient seeking Bariatric Surgery?   YES  Sister in law is post surgery - and also members of her family.   Old co worker post bariatric surgery   Has been reviewing bariatric manual-no questions      Current Barriers to Change: work schedule     Realizes Post- Op Requirements? Yes, and will learn more meeting with the dietitians and social workers through the process     Pre-morbid level of function and history of present illness: GERD     Stressors and Supports: family members are supportive. Stressors in life include find work/life balance and looking to find a new job by end of summer.      Living situation: , two sons (11 &  4), adopted 2 dogs last week   Dinner with family.  Cooking is shared.       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      Mental Health, Trauma and Substance use Assessment     Psychiatric/Psychological Treatment Diagnosis:   Anxiety and Depression  w/Rx " followed by PCP.  Compliant with RX and talk therapy. Reports feeing mentally ready for bariatric surgery. Feels she has proper education on post op MH changes, diet progression, habit changes post op.      History of Eating Disorder:  none noted     Outpatient Counselor Yes  Has current therapy.  [facility].  (In person)      Psychiatrist -  Yes, Dr. Esteban Dickerson      Have you had any Mental Health Higher level of care (ED, PHP or Inpatient ) Treatment? No     Drug and/or Alcohol use and treatment history: none      Have you had any Substance Use Treatment? No     Tobacco/Vaping History: none  Patient agrees to remain nicotine free post surgery.     Domestic Violence: No     Abuse or Trauma History: none noted        Risk Assessment     Identified support system intact.      Risk of harm to self or others:  none noted during evaluation  No HI/SI       Presence of Audio/Visual Hallucinations: Not reported during evaluation      Access to weapons: Not reported during evaluation      Observation: this interview only (SW and RD will follow Brittneythuy Heart through the bariatric program)      Based on the previous information, the client presents the following risk of harm to self or others:  Low risk           Physical/Mental Health Status:               Appearance: appropriate           Sociability: average           Affect: appropriate           Mood: calm           Thought Process: coherent           Speech: normal           Content: no impairment           Orientation: person  Yes , place  Yes , time  Yes , normal attention span  Yes , normal memory  Yes   and normal judgement  Yes            Insight: emotional  good        BARIATRIC SURGERY EDUCATION CHECKLIST     Patient has received the following education related to the bariatric surgery process and understands:     Patients may be required to complete a psychiatric evaluation and receive clearance for surgery from mental health provider.      Patients who undergo weight loss surgery are at higher risk of increased mental health concerns and suicide attempts.     Patients may be required to complete a full substance abuse evaluation and then complete all treatment recommendations prior to surgery.     If diagnosis of abuse/dependence results, patient may be required to remain sober for one (1) year before having bariatric surgery.     Patients on psychiatric medications should check with their provider to discuss psychiatric medications and the changes in absorption.  Patient should discuss all time release medications with provider and take all medications as prescribed.     The recommendation is that there is no use of any tobacco products, Hookah or vapes for the bariatric post-operation patient.     Bariatric surgery patients should not consume alcohol as a post-operative patient as it may increase risk of numerous health conditions including but not limited to alcohol abuse and ulcers.     There is a possibility of weight regain if patient does not follow all program guidelines and recommendations.     Bariatric surgery patients should exercise thirty (30) to sixty (60) minutes per day to maintain post-surgical weight loss.     Research indicates that bariatric patients are more successful when they see a therapist for up to two (2) years post-op.     Patients will follow all medical and dietary recommendations provided.     Patient will keep all scheduled appointments and follow up with their physician for a minimum of five (5) years.     Patient will take all vitamins as recommended.  Post-operative vitamins are life-long.     There is a goal month set.  All requirements should be met by this time. Don't wait to get started!     There is a deadline month set.  All requirements must be finished by this time and if not, the patient will be halted in the surgery process. The patient can be referred to the medical weight management program or can  come back to the surgical program once the unfinished tasks from the previous program are completed.       Female patients of childbearing years are informed that pregnancy is not recommended until 12 - 18 months post-op.        Recommendations: Recommended for surgery  yes     Social Service Note:  Patient presented for behavioral health evaluation for the bariatric program.   Positive for Mental Health with diagnosis of Anxiety and Depression w/Rx.    Current talk therapy.   Current access to Psychiatry.   No reported Drug and/or Alcohol abuse or treatment.  No reported tobacco use.  Patient educated regarding the impact of nicotine and alcohol on the post surgery bariatric patient. Patient has a negative family history of tobacco and alcohol addiction.      Patient has not reported contraindications for bariatric surgery.  Patient will begin making changes with relationship with food through behavior modifications and mindful techniques.  Tracking food intake for one week every three months can assist with weight maintenance and self accountability for post surgery success.   and Dietitian follow up visits are available for pre and post surgery support.  Bariatric support group is available monthly.   Patient verbalized the ability to start making changes and create a healthy relationship around nutrition.      Patient meets criteria for surgery at this time and is referred back to the bariatric surgeon.          Treasure WINCHESTER

## 2025-05-08 ENCOUNTER — CLINICAL SUPPORT (OUTPATIENT)
Dept: BARIATRICS | Facility: CLINIC | Age: 40
End: 2025-05-08

## 2025-05-08 VITALS — BODY MASS INDEX: 43.23 KG/M2 | WEIGHT: 259.8 LBS

## 2025-05-08 DIAGNOSIS — E66.813 OBESITY, CLASS III, BMI 40-49.9 (MORBID OBESITY): Primary | ICD-10-CM

## 2025-05-08 PROCEDURE — RECHECK

## 2025-05-16 ENCOUNTER — OFFICE VISIT (OUTPATIENT)
Dept: BARIATRICS | Facility: CLINIC | Age: 40
End: 2025-05-16
Payer: COMMERCIAL

## 2025-05-16 VITALS
DIASTOLIC BLOOD PRESSURE: 78 MMHG | SYSTOLIC BLOOD PRESSURE: 122 MMHG | WEIGHT: 255 LBS | HEIGHT: 62 IN | HEART RATE: 79 BPM | TEMPERATURE: 98.6 F | BODY MASS INDEX: 46.93 KG/M2

## 2025-05-16 DIAGNOSIS — K21.9 GASTROESOPHAGEAL REFLUX DISEASE WITHOUT ESOPHAGITIS: ICD-10-CM

## 2025-05-16 DIAGNOSIS — E66.813 OBESITY, CLASS III, BMI 40-49.9 (MORBID OBESITY): Primary | ICD-10-CM

## 2025-05-16 PROCEDURE — 99213 OFFICE O/P EST LOW 20 MIN: CPT | Performed by: SURGERY

## 2025-05-16 RX ORDER — BUDESONIDE, GLYCOPYRROLATE, AND FORMOTEROL FUMARATE 160; 9; 4.8 UG/1; UG/1; UG/1
2 AEROSOL, METERED RESPIRATORY (INHALATION) 2 TIMES DAILY
COMMUNITY

## 2025-05-16 NOTE — ASSESSMENT & PLAN NOTE
Currently on omeprazole.  I have discussed with the patient that there is a significant chance of improvement or even resolution of this condition after metabolic/bariatric surgery.  Continue management as per primary care team.

## 2025-05-16 NOTE — ASSESSMENT & PLAN NOTE
39 y.o. female with a longstanding history of morbid obesity and inability to sustain any meaningful weight loss on her own.  She has completed the process within our weight management program and she wishes to undergo a bariatric operation.    She elected to have a a gastric bypass.    I think she is an excellent candidate for the surgery.    I had a detailed discussion with her stressing the fact that long-term success is largely dependent on compliance and abidance to the recommendations of the program as well as participation within the support groups.    She is committed to continue to observe her diet and to increase her physical activity.    She will follow up with us as scheduled.

## 2025-05-16 NOTE — PROGRESS NOTES
"  FOLLOW UP VISIT - BARIATRIC SURGERY  Brittney Heart 39 y.o. female MRN: [id number]  Unit/Bed#:  Encounter: [id number]      HPI:  Brittney Heart is a 39 y.o. female who is in the process to undergo a bariatric operation with us.      Review of Systems   All other systems reviewed and are negative.      Historical Information   Past Medical History:   Diagnosis Date    Anxiety 1999    Depression 1999     Past Surgical History:   Procedure Laterality Date    CHOLECYSTECTOMY  05/2023    GALLBLADDER SURGERY  05/30/2023     Social History   Social History     Substance and Sexual Activity   Alcohol Use Never     Social History     Substance and Sexual Activity   Drug Use Never     Tobacco Use History[1]  Family History: Family history non-contributory    Meds/Allergies   all medications and allergies reviewed  Allergies[2]    Objective       Current Vitals:   Blood Pressure: 122/78 (05/16/25 1329)  Pulse: 79 (05/16/25 1329)  Temperature: 98.6 °F (37 °C) (05/16/25 1329)  Temp Source: Tympanic (05/16/25 1329)  Height: 5' 2\" (157.5 cm) (05/16/25 1329)  Weight - Scale: 116 kg (255 lb) (05/16/25 1329)        Invasive Devices       None                   Physical Exam  Vitals reviewed.   Constitutional:       General: She is not in acute distress.     Appearance: She is well-developed. She is not diaphoretic.   HENT:      Head: Normocephalic and atraumatic.      Right Ear: External ear normal.      Left Ear: External ear normal.      Nose: Nose normal.     Eyes:      General: No scleral icterus.        Right eye: No discharge.         Left eye: No discharge.      Conjunctiva/sclera: Conjunctivae normal.       Neurological:      Mental Status: She is alert and oriented to person, place, and time.     Psychiatric:         Behavior: Behavior normal.         Thought Content: Thought content normal.         Judgment: Judgment normal.         Lab Results: I have personally reviewed pertinent lab results.    Imaging: Results " Review Statement: No pertinent imaging studies reviewed.  EKG, Pathology, and Other Studies: Results Review Statement: No pertinent imaging studies reviewed.  Her endoscopy revealed gastritis and a small type I hiatal hernia.  Final Diagnosis    A. Stomach, bx r/o h pylori:  - Gastric oxyntic and antral type mucosa with minimal or mild chronic inflammation  - No abdelrahman shaped bacteria seen on H&E stained tissue section  - Negative for intestinal metaplasia and dysplasia       Assessment/PLAN:    Acid reflux  Currently on omeprazole.  I have discussed with the patient that there is a significant chance of improvement or even resolution of this condition after metabolic/bariatric surgery.  Continue management as per primary care team.      Obesity, Class III, BMI 40-49.9 (morbid obesity) (HCC)  39 y.o. female with a longstanding history of morbid obesity and inability to sustain any meaningful weight loss on her own.  She has completed the process within our weight management program and she wishes to undergo a bariatric operation.    She elected to have a a gastric bypass.    I think she is an excellent candidate for the surgery.    I had a detailed discussion with her stressing the fact that long-term success is largely dependent on compliance and abidance to the recommendations of the program as well as participation within the support groups.    She is committed to continue to observe her diet and to increase her physical activity.    She will follow up with us as scheduled.      Leon Patricio MD  5/16/2025  1:35 PM      .       [1]   Social History  Tobacco Use   Smoking Status Never    Passive exposure: Past   Smokeless Tobacco Never   [2]   Allergies  Allergen Reactions    Penicillin G Rash

## 2025-05-29 ENCOUNTER — PREP FOR PROCEDURE (OUTPATIENT)
Dept: BARIATRICS | Facility: CLINIC | Age: 40
End: 2025-05-29

## 2025-05-29 DIAGNOSIS — E66.813 OBESITY, CLASS III, BMI 40-49.9 (MORBID OBESITY): Primary | ICD-10-CM

## 2025-05-29 DIAGNOSIS — K21.9 ESOPHAGEAL REFLUX: ICD-10-CM

## 2025-06-16 ENCOUNTER — TELEPHONE (OUTPATIENT)
Dept: BARIATRICS | Facility: CLINIC | Age: 40
End: 2025-06-16

## 2025-06-16 NOTE — TELEPHONE ENCOUNTER
LVM for pt in regards to confirming appts on 6/26/25 at 8 am and 11:45 am for pre-op class and with Dr. Patricio. Patient still unconfirmed.

## 2025-06-25 RX ORDER — TOPIRAMATE 50 MG/1
50 TABLET, FILM COATED ORAL EVERY MORNING
COMMUNITY
Start: 2025-06-06

## 2025-06-25 RX ORDER — OXCARBAZEPINE 300 MG/1
300 TABLET, FILM COATED ORAL
COMMUNITY
Start: 2025-06-06

## 2025-06-25 NOTE — PRE-PROCEDURE INSTRUCTIONS
Pre-Surgery Instructions:   Medication Instructions    Breztri Aerosphere 160-9-4.8 MCG/ACT AERO Uses PRN- OK to take day of surgery    Levonorgestrel (MIRENA) 20 MCG/DAY IUD Implant    omeprazole (PriLOSEC) 20 mg delayed release capsule Take day of surgery.    OXcarbazepine (TRILEPTAL) 300 mg tablet Take night before surgery    sertraline (ZOLOFT) 50 mg tablet Take day of surgery.    topiramate (TOPAMAX) 50 MG tablet Take day of surgery.   Medication instructions for day of surgery reviewed. Please take all instructed medications with only a sip of water. Please do not take any over the counter (non-prescribed) vitamins or supplements for one week prior to date of surgery.      You will receive a call one business day prior to surgery with an arrival time and hospital directions. If your surgery is scheduled on a Monday, the hospital will be calling you on the Friday prior to your surgery. If you have not heard from anyone by 8pm, please call the hospital supervisor through the hospital  at [phone]. ([place] [phone] or [place] [phone]).    Do not eat or drink anything after midnight the night before your surgery, including candy, mints, lifesavers, or chewing gum. Do not drink alcohol 24hrs before your surgery. Try not to smoke at least 24hrs before your surgery.       Follow the pre surgery showering instructions as listed in the “My Surgical Experience Booklet” or otherwise provided by your surgeon's office. Do not use a blade to shave the surgical area 1 week before surgery. It is okay to use a clean electric clippers up to 24 hours before surgery. Do not apply any lotions, creams, including makeup, cologne, deodorant, or perfumes after showering on the day of your surgery. Do not use dry shampoo, hair spray, hair gel, or any type of hair products.     No contact lenses, eye make-up, or artificial eyelashes. Remove nail polish, including gel polish, and any artificial, gel, or acrylic  nails if possible. Remove all jewelry including rings and body piercing jewelry.     Wear causal clothing that is easy to take on and off. Consider your type of surgery.    Keep any valuables, jewelry, piercings at home. Please bring any specially ordered equipment (sling, braces) if indicated.    Arrange for a responsible person to drive you to and from the hospital on the day of your surgery. Please confirm the visitor policy for the day of your procedure when you receive your phone call with an arrival time.     Call the surgeon's office with any new illnesses, exposures, or additional questions prior to surgery.    Please reference your “My Surgical Experience Booklet” for additional information to prepare for your upcoming surgery.

## 2025-06-26 ENCOUNTER — CLINICAL SUPPORT (OUTPATIENT)
Dept: BARIATRICS | Facility: CLINIC | Age: 40
End: 2025-06-26

## 2025-06-26 ENCOUNTER — OFFICE VISIT (OUTPATIENT)
Dept: BARIATRICS | Facility: CLINIC | Age: 40
End: 2025-06-26
Payer: COMMERCIAL

## 2025-06-26 VITALS
HEART RATE: 78 BPM | HEIGHT: 65 IN | SYSTOLIC BLOOD PRESSURE: 126 MMHG | BODY MASS INDEX: 42.82 KG/M2 | OXYGEN SATURATION: 98 % | WEIGHT: 257 LBS | TEMPERATURE: 97.8 F | DIASTOLIC BLOOD PRESSURE: 74 MMHG

## 2025-06-26 DIAGNOSIS — E66.813 OBESITY, CLASS III, BMI 40-49.9 (MORBID OBESITY): Primary | ICD-10-CM

## 2025-06-26 PROCEDURE — 99213 OFFICE O/P EST LOW 20 MIN: CPT | Performed by: SURGERY

## 2025-06-26 PROCEDURE — RECHECK: Performed by: DIETITIAN, REGISTERED

## 2025-06-26 RX ORDER — LEVOFLOXACIN 5 MG/ML
750 INJECTION, SOLUTION INTRAVENOUS EVERY 24 HOURS
OUTPATIENT
Start: 2025-06-26 | End: 2025-06-27

## 2025-06-26 RX ORDER — CELECOXIB 200 MG/1
200 CAPSULE ORAL ONCE
OUTPATIENT
Start: 2025-06-26 | End: 2025-06-26

## 2025-06-26 RX ORDER — HEPARIN SODIUM 5000 [USP'U]/ML
7500 INJECTION, SOLUTION INTRAVENOUS; SUBCUTANEOUS ONCE
OUTPATIENT
Start: 2025-06-26 | End: 2025-06-26

## 2025-06-26 NOTE — H&P (VIEW-ONLY)
"BARIATRIC H&P - BARIATRIC SURGERY  Brittney Heart 39 y.o. female MRN: [id number]  Unit/Bed#:  Encounter: [id number]      HPI:  Brittney Heart is a 39 y.o. female who presents with a long-standing history of morbid obesity.    She was found to be a good candidate to undergo a bariatric operation upon [facility].    She is here today to discuss details of her surgery.    Review of Systems   All other systems reviewed and are negative.      Historical Information   Past Medical History[1]  Past Surgical History[2]  Social History   Social History     Substance and Sexual Activity   Alcohol Use Never     Social History     Substance and Sexual Activity   Drug Use Never     Tobacco Use History[3]  Family History: Family history non-contributory    Meds/Allergies   all medications and allergies reviewed  Allergies[4]    Objective     Current Vitals:   Blood Pressure: 126/74 (06/26/25 1143)  Pulse: 78 (06/26/25 1143)  Temperature: 97.8 °F (36.6 °C) (06/26/25 1143)  Height: 5' 5\" (165.1 cm) (06/26/25 1143)  Weight - Scale: 117 kg (257 lb) (06/26/25 1143)  SpO2: 98 % (06/26/25 1143)      Invasive Devices       None                   Physical Exam  Vitals and nursing note reviewed.   Constitutional:       Appearance: Normal appearance. She is well-developed.   HENT:      Head: Normocephalic and atraumatic.      Nose: Nose normal.     Eyes:      General: No scleral icterus.        Right eye: No discharge.         Left eye: No discharge.      Conjunctiva/sclera: Conjunctivae normal.       Cardiovascular:      Rate and Rhythm: Normal rate and regular rhythm.      Heart sounds: Normal heart sounds.   Pulmonary:      Effort: Pulmonary effort is normal.      Breath sounds: Normal breath sounds. No stridor. No wheezing or rales.   Chest:      Chest wall: No tenderness.   Abdominal:      General: Bowel sounds are normal.      Palpations: Abdomen is soft.      Tenderness: There is no " abdominal tenderness. There is no guarding or rebound.      Comments: Abdomen is obese, soft and benign.  Well-healed laparoscopic incisions from previous cholecystectomy     Musculoskeletal:         General: Normal range of motion.      Cervical back: Normal range of motion and neck supple.   Lymphadenopathy:      Cervical: No cervical adenopathy.     Skin:     General: Skin is warm and dry.      Findings: No erythema or rash.     Neurological:      Mental Status: She is alert and oriented to person, place, and time.     Psychiatric:         Behavior: Behavior normal.         Thought Content: Thought content normal.         Judgment: Judgment normal.         Lab Results: I have personally reviewed pertinent lab results.    Imaging: Results Review Statement: No pertinent imaging studies reviewed.  EKG, Pathology, and Other Studies: Results Review Statement: No pertinent imaging studies reviewed.  The endoscopy showed gastritis and a small type I hiatal hernia.  The biopsies revealed  Final Diagnosis    A. Stomach, bx r/o h pylori:  - Gastric oxyntic and antral type mucosa with minimal or mild chronic inflammation  - No abdelrahman shaped bacteria seen on H&E stained tissue section  - Negative for intestinal metaplasia and dysplasia       Assessment/PLAN:    Obesity, Class III, BMI 40-49.9 (morbid obesity) (HCC)  39 y.o. female morbidly obese found to be a good candidate to undergo a weight loss operation upon being enrolled here at the [facility].    Patient has a long history of morbid obesity and is presenting to discuss the surgical weight loss options. Despite the patient best efforts patient was unable to lose any meaningful or sustainable weight using nonsurgical means.We had a long discussion regarding all the surgical weight-loss options at our disposal at this point and reviewed the risks and benefits of each procedure in details as it relates to her age, BMI and medical conditions.    She has  been pre certified to undergo a Laparoscopic Rajan-en-Y gastric bypass.    Here today to review her pre op test results.      Has been medically cleared for the procedure.      I have discussed with her at length the risks and benefits of the operation and reiterated the components of our multidisciplinary program and the importance of compliance and follow up in the post operative period. Although there is a great statistical chance of improvement or even resolution of most of her associated comorbidities, the results vary from patient to patient and they largely depend on her commitment.     The patient was also instructed with regards to the importance of behavior modification, nutritional counseling, support meeting attendance and lifestyle changes that are important to ensure success.    I have explained and reviewed the instructions for stopping or tapering anti-obesity medications prior to surgery.  She was given the opportunity to ask questions and I have answered all of them.     I have addressed with the patient the level of CODE STATUS for this hospital stay and after explaining the different options currently she wishes to be a Level I.    She understands and wishes to proceed.    She has lost all the weight required prior to surgery.      Leon Patricio MD  6/26/2025  12:02 PM       [1]   Past Medical History:  Diagnosis Date    Anxiety 1999    Asthma     Concussion 2009    Depression 1999    GERD (gastroesophageal reflux disease)     Motion sickness     Pneumonia 2024   [2]   Past Surgical History:  Procedure Laterality Date    CHOLECYSTECTOMY  05/2023    EGD      GALLBLADDER SURGERY  05/30/2023    WISDOM TOOTH EXTRACTION     [3]   Social History  Tobacco Use   Smoking Status Former    Types: Cigarettes    Passive exposure: Past   Smokeless Tobacco Never   Tobacco Comments    Social smoker quit 2023   [4]   Allergies  Allergen Reactions    Oxycodone-Acetaminophen Vomiting and Headache     Sweats,  dizzy    Penicillin G Rash

## 2025-06-26 NOTE — TELEPHONE ENCOUNTER
Pt Is Having  BYPASS GASTRIC  LASHELL-EN-Y LAPAROSCOPIC With Dr. Patricio on  07/07/2025     PO Meds for BYPASS GASTRIC  LASHELL-EN-Y LAPAROSCOPIC  on 07/07/2025 With Dr. Patricio      *Pt Has ALLERGIES to        Oxycodone-acetaminophen Not Specified Vomiting, Headache Sweats, dizzy   Penicillin G Low Rash

## 2025-06-26 NOTE — H&P
"BARIATRIC H&P - BARIATRIC SURGERY  Brittney Heart 39 y.o. female MRN: [id number]  Unit/Bed#:  Encounter: [id number]      HPI:  Brittney Heart is a 39 y.o. female who presents with a long-standing history of morbid obesity.    She was found to be a good candidate to undergo a bariatric operation upon being enrolled [facility].    She is here today to discuss details of her surgery.    Review of Systems   All other systems reviewed and are negative.      Historical Information   Past Medical History[1]  Past Surgical History[2]  Social History   Social History     Substance and Sexual Activity   Alcohol Use Never     Social History     Substance and Sexual Activity   Drug Use Never     Tobacco Use History[3]  Family History: Family history non-contributory    Meds/Allergies   all medications and allergies reviewed  Allergies[4]    Objective     Current Vitals:   Blood Pressure: 126/74 (06/26/25 1143)  Pulse: 78 (06/26/25 1143)  Temperature: 97.8 °F (36.6 °C) (06/26/25 1143)  Height: 5' 5\" (165.1 cm) (06/26/25 1143)  Weight - Scale: 117 kg (257 lb) (06/26/25 1143)  SpO2: 98 % (06/26/25 1143)      Invasive Devices       None                   Physical Exam  Vitals and nursing note reviewed.   Constitutional:       Appearance: Normal appearance. She is well-developed.   HENT:      Head: Normocephalic and atraumatic.      Nose: Nose normal.     Eyes:      General: No scleral icterus.        Right eye: No discharge.         Left eye: No discharge.      Conjunctiva/sclera: Conjunctivae normal.       Cardiovascular:      Rate and Rhythm: Normal rate and regular rhythm.      Heart sounds: Normal heart sounds.   Pulmonary:      Effort: Pulmonary effort is normal.      Breath sounds: Normal breath sounds. No stridor. No wheezing or rales.   Chest:      Chest wall: No tenderness.   Abdominal:      General: Bowel sounds are normal.      Palpations: Abdomen is soft.      Tenderness: There is no " abdominal tenderness. There is no guarding or rebound.      Comments: Abdomen is obese, soft and benign.  Well-healed laparoscopic incisions from previous cholecystectomy     Musculoskeletal:         General: Normal range of motion.      Cervical back: Normal range of motion and neck supple.   Lymphadenopathy:      Cervical: No cervical adenopathy.     Skin:     General: Skin is warm and dry.      Findings: No erythema or rash.     Neurological:      Mental Status: She is alert and oriented to person, place, and time.     Psychiatric:         Behavior: Behavior normal.         Thought Content: Thought content normal.         Judgment: Judgment normal.         Lab Results: I have personally reviewed pertinent lab results.    Imaging: Results Review Statement: No pertinent imaging studies reviewed.  EKG, Pathology, and Other Studies: Results Review Statement: No pertinent imaging studies reviewed.  The endoscopy showed gastritis and a small type I hiatal hernia.  The biopsies revealed  Final Diagnosis    A. Stomach, bx r/o h pylori:  - Gastric oxyntic and antral type mucosa with minimal or mild chronic inflammation  - No abdelrahman shaped bacteria seen on H&E stained tissue section  - Negative for intestinal metaplasia and dysplasia       Assessment/PLAN:    Obesity, Class III, BMI 40-49.9 (morbid obesity) (HCC)  39 y.o. female morbidly obese found to be a good candidate to undergo a weight loss operation upon being enrolled here at the [facility].    Patient has a long history of morbid obesity and is presenting to discuss the surgical weight loss options. Despite the patient best efforts patient was unable to lose any meaningful or sustainable weight using nonsurgical means.We had a long discussion regarding all the surgical weight-loss options at our disposal at this point and reviewed the risks and benefits of each procedure in details as it relates to her age, BMI and medical conditions.    She has  been pre certified to undergo a Laparoscopic Rajan-en-Y gastric bypass.    Here today to review her pre op test results.      Has been medically cleared for the procedure.      I have discussed with her at length the risks and benefits of the operation and reiterated the components of our multidisciplinary program and the importance of compliance and follow up in the post operative period. Although there is a great statistical chance of improvement or even resolution of most of her associated comorbidities, the results vary from patient to patient and they largely depend on her commitment.     The patient was also instructed with regards to the importance of behavior modification, nutritional counseling, support meeting attendance and lifestyle changes that are important to ensure success.    I have explained and reviewed the instructions for stopping or tapering anti-obesity medications prior to surgery.  She was given the opportunity to ask questions and I have answered all of them.     I have addressed with the patient the level of CODE STATUS for this hospital stay and after explaining the different options currently she wishes to be a Level I.    She understands and wishes to proceed.    She has lost all the weight required prior to surgery.      Leon Patricio MD  6/26/2025  12:02 PM       [1]   Past Medical History:  Diagnosis Date    Anxiety 1999    Asthma     Concussion 2009    Depression 1999    GERD (gastroesophageal reflux disease)     Motion sickness     Pneumonia 2024   [2]   Past Surgical History:  Procedure Laterality Date    CHOLECYSTECTOMY  05/2023    EGD      GALLBLADDER SURGERY  05/30/2023    WISDOM TOOTH EXTRACTION     [3]   Social History  Tobacco Use   Smoking Status Former    Types: Cigarettes    Passive exposure: Past   Smokeless Tobacco Never   Tobacco Comments    Social smoker quit 2023   [4]   Allergies  Allergen Reactions    Oxycodone-Acetaminophen Vomiting and Headache     Sweats,  dizzy    Penicillin G Rash

## 2025-06-26 NOTE — ASSESSMENT & PLAN NOTE
39 y.o. female morbidly obese found to be a good candidate to undergo a weight loss operation upon being enrolled here at the [facility].    Patient has a long history of morbid obesity and is presenting to discuss the surgical weight loss options. Despite the patient best efforts patient was unable to lose any meaningful or sustainable weight using nonsurgical means.We had a long discussion regarding all the surgical weight-loss options at our disposal at this point and reviewed the risks and benefits of each procedure in details as it relates to her age, BMI and medical conditions.    She has been pre certified to undergo a Laparoscopic Rajan-en-Y gastric bypass.    Here today to review her pre op test results.      Has been medically cleared for the procedure.      I have discussed with her at length the risks and benefits of the operation and reiterated the components of our multidisciplinary program and the importance of compliance and follow up in the post operative period. Although there is a great statistical chance of improvement or even resolution of most of her associated comorbidities, the results vary from patient to patient and they largely depend on her commitment.     The patient was also instructed with regards to the importance of behavior modification, nutritional counseling, support meeting attendance and lifestyle changes that are important to ensure success.    I have explained and reviewed the instructions for stopping or tapering anti-obesity medications prior to surgery.  She was given the opportunity to ask questions and I have answered all of them.     I have addressed with the patient the level of CODE STATUS for this hospital stay and after explaining the different options currently she wishes to be a Level I.    She understands and wishes to proceed.    She has lost all the weight required prior to surgery.

## 2025-06-30 ENCOUNTER — TELEPHONE (OUTPATIENT)
Dept: BARIATRICS | Facility: CLINIC | Age: 40
End: 2025-06-30

## 2025-07-02 RX ORDER — OMEPRAZOLE 20 MG/1
20 CAPSULE, DELAYED RELEASE ORAL DAILY
Qty: 90 CAPSULE | Refills: 1 | Status: SHIPPED | OUTPATIENT
Start: 2025-07-02

## 2025-07-03 ENCOUNTER — ANESTHESIA EVENT (OUTPATIENT)
Dept: PERIOP | Facility: HOSPITAL | Age: 40
End: 2025-07-03
Payer: COMMERCIAL

## 2025-07-07 ENCOUNTER — ANESTHESIA (OUTPATIENT)
Dept: PERIOP | Facility: HOSPITAL | Age: 40
End: 2025-07-07
Payer: COMMERCIAL

## 2025-07-07 ENCOUNTER — HOSPITAL ENCOUNTER (OUTPATIENT)
Facility: HOSPITAL | Age: 40
Setting detail: OUTPATIENT SURGERY
Discharge: HOME | End: 2025-07-08
Attending: SURGERY | Admitting: SURGERY
Payer: COMMERCIAL

## 2025-07-07 DIAGNOSIS — E66.813 OBESITY, CLASS III, BMI 40-49.9 (MORBID OBESITY): Primary | ICD-10-CM

## 2025-07-07 PROBLEM — J45.909 ASTHMA: Status: ACTIVE | Noted: 2025-07-07

## 2025-07-07 LAB
EXT PREGNANCY TEST URINE: NEGATIVE
EXT. CONTROL: NORMAL

## 2025-07-07 PROCEDURE — 43644 LAP GASTRIC BYPASS/ROUX-EN-Y: CPT | Performed by: SURGERY

## 2025-07-07 PROCEDURE — 81025 URINE PREGNANCY TEST: CPT | Performed by: SURGERY

## 2025-07-07 PROCEDURE — C1781 MESH (IMPLANTABLE): HCPCS | Performed by: SURGERY

## 2025-07-07 PROCEDURE — 43644 LAP GASTRIC BYPASS/ROUX-EN-Y: CPT | Performed by: STUDENT IN AN ORGANIZED HEALTH CARE EDUCATION/TRAINING PROGRAM

## 2025-07-07 DEVICE — SEAMGUARD STPL REINF ENDO GIA ULTRA UNIV 60 PURPLE: Type: IMPLANTABLE DEVICE | Site: STOMACH | Status: FUNCTIONAL

## 2025-07-07 RX ORDER — MORPHINE SULFATE 4 MG/ML
4 INJECTION, SOLUTION INTRAMUSCULAR; INTRAVENOUS EVERY 4 HOURS PRN
Status: DISCONTINUED | OUTPATIENT
Start: 2025-07-07 | End: 2025-07-08 | Stop reason: HOSPADM

## 2025-07-07 RX ORDER — HYDROMORPHONE HCL/PF 1 MG/ML
0.5 SYRINGE (ML) INJECTION
Status: DISCONTINUED | OUTPATIENT
Start: 2025-07-07 | End: 2025-07-07 | Stop reason: HOSPADM

## 2025-07-07 RX ORDER — DIPHENHYDRAMINE HCL 25 MG
25 TABLET ORAL EVERY 8 HOURS PRN
Status: DISCONTINUED | OUTPATIENT
Start: 2025-07-07 | End: 2025-07-08 | Stop reason: HOSPADM

## 2025-07-07 RX ORDER — METOCLOPRAMIDE HYDROCHLORIDE 5 MG/ML
10 INJECTION INTRAMUSCULAR; INTRAVENOUS EVERY 6 HOURS PRN
Status: DISCONTINUED | OUTPATIENT
Start: 2025-07-07 | End: 2025-07-08 | Stop reason: HOSPADM

## 2025-07-07 RX ORDER — HYDROMORPHONE HCL/PF 1 MG/ML
0.5 SYRINGE (ML) INJECTION
Status: CANCELLED | OUTPATIENT
Start: 2025-07-07

## 2025-07-07 RX ORDER — LEVOFLOXACIN 5 MG/ML
750 INJECTION, SOLUTION INTRAVENOUS EVERY 24 HOURS
Status: CANCELLED | OUTPATIENT
Start: 2025-07-07 | End: 2025-07-08

## 2025-07-07 RX ORDER — FENTANYL CITRATE/PF 50 MCG/ML
50 SYRINGE (ML) INJECTION
Refills: 0 | Status: CANCELLED | OUTPATIENT
Start: 2025-07-07

## 2025-07-07 RX ORDER — LIDOCAINE HYDROCHLORIDE 20 MG/ML
INJECTION, SOLUTION EPIDURAL; INFILTRATION; INTRACAUDAL; PERINEURAL AS NEEDED
Status: DISCONTINUED | OUTPATIENT
Start: 2025-07-07 | End: 2025-07-07

## 2025-07-07 RX ORDER — MAGNESIUM HYDROXIDE 1200 MG/15ML
LIQUID ORAL AS NEEDED
Status: DISCONTINUED | OUTPATIENT
Start: 2025-07-07 | End: 2025-07-07 | Stop reason: HOSPADM

## 2025-07-07 RX ORDER — ONDANSETRON 2 MG/ML
INJECTION INTRAMUSCULAR; INTRAVENOUS AS NEEDED
Status: DISCONTINUED | OUTPATIENT
Start: 2025-07-07 | End: 2025-07-07

## 2025-07-07 RX ORDER — SODIUM CHLORIDE 9 MG/ML
INJECTION, SOLUTION INTRAVENOUS CONTINUOUS PRN
Status: DISCONTINUED | OUTPATIENT
Start: 2025-07-07 | End: 2025-07-07

## 2025-07-07 RX ORDER — FENTANYL CITRATE 50 UG/ML
INJECTION, SOLUTION INTRAMUSCULAR; INTRAVENOUS AS NEEDED
Status: DISCONTINUED | OUTPATIENT
Start: 2025-07-07 | End: 2025-07-07

## 2025-07-07 RX ORDER — ROCURONIUM BROMIDE 10 MG/ML
INJECTION, SOLUTION INTRAVENOUS AS NEEDED
Status: DISCONTINUED | OUTPATIENT
Start: 2025-07-07 | End: 2025-07-07

## 2025-07-07 RX ORDER — TOPIRAMATE 25 MG/1
50 TABLET, FILM COATED ORAL EVERY MORNING
Status: DISCONTINUED | OUTPATIENT
Start: 2025-07-08 | End: 2025-07-08 | Stop reason: HOSPADM

## 2025-07-07 RX ORDER — MIDAZOLAM HYDROCHLORIDE 2 MG/2ML
INJECTION, SOLUTION INTRAMUSCULAR; INTRAVENOUS AS NEEDED
Status: DISCONTINUED | OUTPATIENT
Start: 2025-07-07 | End: 2025-07-07

## 2025-07-07 RX ORDER — OXCARBAZEPINE 300 MG/1
300 TABLET, FILM COATED ORAL
Status: DISCONTINUED | OUTPATIENT
Start: 2025-07-07 | End: 2025-07-08 | Stop reason: HOSPADM

## 2025-07-07 RX ORDER — ONDANSETRON 2 MG/ML
4 INJECTION INTRAMUSCULAR; INTRAVENOUS ONCE AS NEEDED
Status: CANCELLED | OUTPATIENT
Start: 2025-07-07

## 2025-07-07 RX ORDER — LEVOFLOXACIN 5 MG/ML
750 INJECTION, SOLUTION INTRAVENOUS EVERY 24 HOURS
Status: COMPLETED | OUTPATIENT
Start: 2025-07-07 | End: 2025-07-07

## 2025-07-07 RX ORDER — PROMETHAZINE HYDROCHLORIDE 25 MG/ML
25 INJECTION, SOLUTION INTRAMUSCULAR; INTRAVENOUS EVERY 6 HOURS PRN
Status: DISCONTINUED | OUTPATIENT
Start: 2025-07-07 | End: 2025-07-08 | Stop reason: HOSPADM

## 2025-07-07 RX ORDER — METRONIDAZOLE 500 MG/100ML
500 INJECTION, SOLUTION INTRAVENOUS ONCE
Status: CANCELLED | OUTPATIENT
Start: 2025-07-07 | End: 2025-07-07

## 2025-07-07 RX ORDER — ACETAMINOPHEN 10 MG/ML
1000 INJECTION, SOLUTION INTRAVENOUS ONCE
Status: COMPLETED | OUTPATIENT
Start: 2025-07-07 | End: 2025-07-07

## 2025-07-07 RX ORDER — ACETAMINOPHEN 10 MG/ML
1000 INJECTION, SOLUTION INTRAVENOUS EVERY 6 HOURS SCHEDULED
Status: DISCONTINUED | OUTPATIENT
Start: 2025-07-07 | End: 2025-07-08 | Stop reason: HOSPADM

## 2025-07-07 RX ORDER — SIMETHICONE 80 MG
80 TABLET,CHEWABLE ORAL 4 TIMES DAILY PRN
Status: DISCONTINUED | OUTPATIENT
Start: 2025-07-07 | End: 2025-07-08 | Stop reason: HOSPADM

## 2025-07-07 RX ORDER — CELECOXIB 200 MG/1
200 CAPSULE ORAL ONCE
Status: COMPLETED | OUTPATIENT
Start: 2025-07-07 | End: 2025-07-07

## 2025-07-07 RX ORDER — SODIUM CHLORIDE, SODIUM LACTATE, POTASSIUM CHLORIDE, CALCIUM CHLORIDE 600; 310; 30; 20 MG/100ML; MG/100ML; MG/100ML; MG/100ML
100 INJECTION, SOLUTION INTRAVENOUS CONTINUOUS
Status: DISCONTINUED | OUTPATIENT
Start: 2025-07-07 | End: 2025-07-08 | Stop reason: HOSPADM

## 2025-07-07 RX ORDER — KETAMINE HCL IN NACL, ISO-OSM 100MG/10ML
SYRINGE (ML) INJECTION AS NEEDED
Status: DISCONTINUED | OUTPATIENT
Start: 2025-07-07 | End: 2025-07-07

## 2025-07-07 RX ORDER — DEXAMETHASONE SODIUM PHOSPHATE 10 MG/ML
INJECTION, SOLUTION INTRAMUSCULAR; INTRAVENOUS AS NEEDED
Status: DISCONTINUED | OUTPATIENT
Start: 2025-07-07 | End: 2025-07-07

## 2025-07-07 RX ORDER — METRONIDAZOLE 500 MG/100ML
500 INJECTION, SOLUTION INTRAVENOUS ONCE
Status: COMPLETED | OUTPATIENT
Start: 2025-07-07 | End: 2025-07-07

## 2025-07-07 RX ORDER — OXYCODONE HCL 5 MG/5 ML
5 SOLUTION, ORAL ORAL EVERY 4 HOURS PRN
Status: DISCONTINUED | OUTPATIENT
Start: 2025-07-07 | End: 2025-07-08 | Stop reason: HOSPADM

## 2025-07-07 RX ORDER — HEPARIN SODIUM 5000 [USP'U]/ML
7500 INJECTION, SOLUTION INTRAVENOUS; SUBCUTANEOUS ONCE
Status: COMPLETED | OUTPATIENT
Start: 2025-07-07 | End: 2025-07-07

## 2025-07-07 RX ORDER — LORAZEPAM 2 MG/ML
1 INJECTION INTRAMUSCULAR ONCE AS NEEDED
Status: DISCONTINUED | OUTPATIENT
Start: 2025-07-07 | End: 2025-07-07 | Stop reason: HOSPADM

## 2025-07-07 RX ORDER — SODIUM CHLORIDE, SODIUM LACTATE, POTASSIUM CHLORIDE, CALCIUM CHLORIDE 600; 310; 30; 20 MG/100ML; MG/100ML; MG/100ML; MG/100ML
125 INJECTION, SOLUTION INTRAVENOUS CONTINUOUS
Status: DISCONTINUED | OUTPATIENT
Start: 2025-07-07 | End: 2025-07-08 | Stop reason: HOSPADM

## 2025-07-07 RX ORDER — ONDANSETRON 2 MG/ML
4 INJECTION INTRAMUSCULAR; INTRAVENOUS EVERY 6 HOURS PRN
Status: DISCONTINUED | OUTPATIENT
Start: 2025-07-07 | End: 2025-07-08 | Stop reason: HOSPADM

## 2025-07-07 RX ORDER — OXYCODONE HCL 5 MG/5 ML
10 SOLUTION, ORAL ORAL EVERY 4 HOURS PRN
Status: DISCONTINUED | OUTPATIENT
Start: 2025-07-07 | End: 2025-07-08 | Stop reason: HOSPADM

## 2025-07-07 RX ORDER — FAMOTIDINE 10 MG/ML
20 INJECTION, SOLUTION INTRAVENOUS 2 TIMES DAILY
Status: DISCONTINUED | OUTPATIENT
Start: 2025-07-07 | End: 2025-07-08 | Stop reason: HOSPADM

## 2025-07-07 RX ORDER — ONDANSETRON 2 MG/ML
4 INJECTION INTRAMUSCULAR; INTRAVENOUS ONCE AS NEEDED
Status: DISCONTINUED | OUTPATIENT
Start: 2025-07-07 | End: 2025-07-07 | Stop reason: HOSPADM

## 2025-07-07 RX ORDER — PROPOFOL 10 MG/ML
INJECTION, EMULSION INTRAVENOUS AS NEEDED
Status: DISCONTINUED | OUTPATIENT
Start: 2025-07-07 | End: 2025-07-07

## 2025-07-07 RX ADMIN — PHENYLEPHRINE HYDROCHLORIDE 100 MCG: 10 INJECTION INTRAVENOUS at 11:13

## 2025-07-07 RX ADMIN — HYDROMORPHONE HYDROCHLORIDE 0.5 MG: 0.5 INJECTION, SOLUTION INTRAMUSCULAR; INTRAVENOUS; SUBCUTANEOUS at 12:30

## 2025-07-07 RX ADMIN — PROPOFOL 200 MG: 10 INJECTION, EMULSION INTRAVENOUS at 09:29

## 2025-07-07 RX ADMIN — FAMOTIDINE 20 MG: 10 INJECTION, SOLUTION INTRAVENOUS at 20:30

## 2025-07-07 RX ADMIN — ACETAMINOPHEN 1000 MG: 10 INJECTION INTRAVENOUS at 23:15

## 2025-07-07 RX ADMIN — FENTANYL CITRATE 50 MCG: 50 INJECTION INTRAMUSCULAR; INTRAVENOUS at 09:26

## 2025-07-07 RX ADMIN — HEPARIN SODIUM 7500 UNITS: 5000 INJECTION INTRAVENOUS; SUBCUTANEOUS at 08:09

## 2025-07-07 RX ADMIN — ROCURONIUM 20 MG: 50 INJECTION, SOLUTION INTRAVENOUS at 10:13

## 2025-07-07 RX ADMIN — SODIUM CHLORIDE: 0.9 INJECTION, SOLUTION INTRAVENOUS at 09:20

## 2025-07-07 RX ADMIN — Medication 25 MG: at 09:27

## 2025-07-07 RX ADMIN — DEXMEDETOMIDINE 12 MCG: 100 INJECTION, SOLUTION, CONCENTRATE INTRAVENOUS at 09:17

## 2025-07-07 RX ADMIN — DEXAMETHASONE SODIUM PHOSPHATE 10 MG: 10 INJECTION, SOLUTION INTRAMUSCULAR; INTRAVENOUS at 09:41

## 2025-07-07 RX ADMIN — METRONIDAZOLE: 500 INJECTION, SOLUTION INTRAVENOUS at 09:59

## 2025-07-07 RX ADMIN — PROPOFOL 100 MCG/KG/MIN: 10 INJECTION, EMULSION INTRAVENOUS at 09:35

## 2025-07-07 RX ADMIN — PHENYLEPHRINE HYDROCHLORIDE 100 MCG: 10 INJECTION INTRAVENOUS at 11:12

## 2025-07-07 RX ADMIN — MIDAZOLAM 2 MG: 1 INJECTION INTRAMUSCULAR; INTRAVENOUS at 09:17

## 2025-07-07 RX ADMIN — SODIUM CHLORIDE, SODIUM LACTATE, POTASSIUM CHLORIDE, AND CALCIUM CHLORIDE 100 ML/HR: .6; .31; .03; .02 INJECTION, SOLUTION INTRAVENOUS at 13:12

## 2025-07-07 RX ADMIN — ACETAMINOPHEN 1000 MG: 10 INJECTION INTRAVENOUS at 10:25

## 2025-07-07 RX ADMIN — HYDROMORPHONE HYDROCHLORIDE 0.5 MG: 0.5 INJECTION, SOLUTION INTRAMUSCULAR; INTRAVENOUS; SUBCUTANEOUS at 12:10

## 2025-07-07 RX ADMIN — FAMOTIDINE 20 MG: 10 INJECTION, SOLUTION INTRAVENOUS at 13:17

## 2025-07-07 RX ADMIN — OXYCODONE HYDROCHLORIDE 10 MG: 5 SOLUTION ORAL at 17:12

## 2025-07-07 RX ADMIN — OXCARBAZEPINE 300 MG: 300 TABLET, FILM COATED ORAL at 22:01

## 2025-07-07 RX ADMIN — MORPHINE SULFATE 4 MG: 4 INJECTION INTRAVENOUS at 20:30

## 2025-07-07 RX ADMIN — HYDROMORPHONE HYDROCHLORIDE 0.5 MG: 0.5 INJECTION, SOLUTION INTRAMUSCULAR; INTRAVENOUS; SUBCUTANEOUS at 12:00

## 2025-07-07 RX ADMIN — ROCURONIUM 50 MG: 50 INJECTION, SOLUTION INTRAVENOUS at 09:30

## 2025-07-07 RX ADMIN — LEVOFLOXACIN: 750 INJECTION, SOLUTION INTRAVENOUS at 09:39

## 2025-07-07 RX ADMIN — Medication 25 MG: at 10:57

## 2025-07-07 RX ADMIN — LIDOCAINE HYDROCHLORIDE 100 MG: 20 INJECTION, SOLUTION EPIDURAL; INFILTRATION; INTRACAUDAL at 09:27

## 2025-07-07 RX ADMIN — SIMETHICONE 80 MG: 80 TABLET, CHEWABLE ORAL at 14:20

## 2025-07-07 RX ADMIN — PHENYLEPHRINE HYDROCHLORIDE 30 MCG/MIN: 10 INJECTION INTRAVENOUS at 10:02

## 2025-07-07 RX ADMIN — FENTANYL CITRATE 50 MCG: 50 INJECTION INTRAMUSCULAR; INTRAVENOUS at 10:13

## 2025-07-07 RX ADMIN — CELECOXIB 200 MG: 200 CAPSULE ORAL at 07:49

## 2025-07-07 RX ADMIN — SODIUM CHLORIDE, SODIUM LACTATE, POTASSIUM CHLORIDE, AND CALCIUM CHLORIDE 100 ML/HR: .6; .31; .03; .02 INJECTION, SOLUTION INTRAVENOUS at 22:08

## 2025-07-07 RX ADMIN — ONDANSETRON 4 MG: 2 INJECTION INTRAMUSCULAR; INTRAVENOUS at 09:17

## 2025-07-07 RX ADMIN — FOSAPREPITANT DIMEGLUMINE 150 MG: 150 INJECTION, POWDER, LYOPHILIZED, FOR SOLUTION INTRAVENOUS at 08:00

## 2025-07-07 RX ADMIN — SUGAMMADEX 200 MG: 100 INJECTION, SOLUTION INTRAVENOUS at 11:39

## 2025-07-07 RX ADMIN — ACETAMINOPHEN 1000 MG: 10 INJECTION INTRAVENOUS at 17:12

## 2025-07-07 NOTE — ANESTHESIA PREPROCEDURE EVALUATION
Procedure:  BYPASS GASTRIC  LASHELL-EN-Y LAPAROSCOPIC WITH INTRAOPERATIVE EGD (Abdomen)    Relevant Problems   GI/HEPATIC   (+) Acid reflux      NEURO/PSYCH   (+) Anxiety and depression      PULMONARY   (+) Asthma      Other   (+) Obesity, Class III, BMI 40-49.9 (morbid obesity)        Physical Exam    Airway     Mallampati score: II  TM Distance: >3 FB  Neck ROM: full  Upper bite lip test: II  Mouth opening: >= 4 cm      Cardiovascular  Rhythm: regular, Rate: normalCardiovascular exam normal    Dental   No notable dental hx     Pulmonary  Pulmonary exam normal Breath sounds clear to auscultation    Neurological    She appears awake, alert and oriented x3.      Other Findings  post-pubertal.      Anesthesia Plan  ASA Score- 3     Anesthesia Type- general with ASA Monitors.         Additional Monitors:     Airway Plan:            Plan Factors-Exercise tolerance (METS): >4 METS.    Chart reviewed.        Patient is not a current smoker.      Obstructive sleep apnea risk education given perioperatively.        Induction-     Postoperative Plan- .   Monitoring Plan - Monitoring plan - standard ASA monitoring  Post Operative Pain Plan - multimodal analgesia        Informed Consent- Anesthetic plan and risks discussed with patient.        NPO Status:  Vitals Value Taken Time   Date of last liquid 07/07/25 07/07/25 07:51   Time of last liquid 0750 07/07/25 07:51   Date of last solid 07/06/25 07/07/25 07:51   Time of last solid 1800 07/07/25 07:51

## 2025-07-07 NOTE — OP NOTE
[facility]   [address] [place] [address], [address], [address] [phone] (Fax)      Operative Report  BYPASS GASTRIC  LASHELL-EN-Y LAPAROSCOPIC WITH INTRAOPERATIVE EGD     Patient Name: Brittney Heart    :  1985  MRN: [id number]  Patient Location: [facility] ROOM 06  Surgery Date : 2025  Surgeons:  Surgeons and Role:     * Leon Patricio MD - Primary     * Marty Seals MD - Assisting    Diagnosis:    Pre-Op Diagnosis Codes:  Obesity, Class III, BMI 40-49.9 (morbid obesity) [E66.813]  Esophageal reflux [K21.9]    Post-Op Diagnosis Codes:     * Obesity, Class III, BMI 40-49.9 (morbid obesity) [E66.813]     * Esophageal reflux [K21.9]    Procedure  Laparoscopic Lashell-en-Y Gastric Bypass  Intraoperative Endoscopy    Specimen(s):  * No specimens in log *    Estimated Blood Loss:    20 mL    Anesthesia Type:     General    Operative Indications:    Obesity, Class III, BMI 40-49.9 (morbid obesity) [E66.813]  Esophageal reflux [K21.9]      Operative Findings:    Normal anatomy    Complications:     None    Procedure and Technique:    INDICATION:    Brittney Heart is a 39 y.o. female with a Body mass index is 41.38 kg/m². and a long standing history of morbid obesity and inability to lose a significant amount of weight on its own.  This patient was found to be a good candidate to undergo a bariatric procedure upon being enrolled here at the [facility].    OPERATIVE TECHNIQUE    The patient was taken to the operating room and placed in a supine position. A dose of IV antibiotic prophylaxis that consisted of Levofloxacin 750mg and Metronidazole 500mg were given. Also, 5000 units of subcutaneous unfractionated heparin to prevent DVT were administered. Sequential compression devices were placed on both lower extremities.    After satisfactory general anesthesia induction and endotracheal intubation was achieved, the extremities were secured to prevent  neurovascular and musculoskeletal injuries as best as possible. Subsequently, the abdominal wall was prepped and draped in a surgical standard sterile fashion.  After a timeout was done and the patient was properly identified and the type of procedure was confirmed a supra-umbilical transverse skin incision was made, and the subcutaneous tissues dissected. Access to the peritoneal cavity was gained with an optical trocar. With this device, we were able to visualize the layers of the abdominal wall, and enter the peritoneal cavity under direct visualization. Pneumoperitoneum was then established with CO2 insufflation.     A four quadrant transversus abdominis plane block was performed under direct laparoscopic vision.  After this was completed four additional trocars were placed: a 12 mm in the right upper quadrant subcostal position in the anterior axillary line, a 12-mm port was placed in the right flank midclavicular line, a 12-mm port was placed in the left upper quadrant subcostal position in the midclavicular line and another 12-mm port was placed in the left quadrant anterior axillary line lateral to the supraumbilical port.    With the trocars in place, the dissection was begun.    The omentum of the transverse colon was identified and elevated, this allowed for the ligament of Treitz to be visualized.  The small bowel was run about 60 cm to a point distal from the ligament of Treitz and was divided with a stapler and a 60 mm cartridge. The Rajan limb was then measured at 100 cm, and the 100 cm brayan was brought in side-to-side opposition to the biliopancreatic limb. A side-to-side jejunojejunostomy was then created. This was accomplished by first making an antimesenteric enterotomy with cautery energy device. We then positioned the laparoscopic stapler with a 60-mm cartridge within the lumen of the bowel to create a stapled side-to-side anastomosis. The enterotomy was then approximated with a 2-0 silk suture,  subsequently elevated and the stapler was then reloaded and positioned perpendicularly to the first staple lines, just below the margin of the enterotomy on the antimesenteric border of the bowel and closed transversely utilizing an additional 60-mm cartridge. The resulting mesenteric defect was then closed with a running nonabsorbable suture. A Brolin stitch was placed to prevent kinking.    At this point we repositioned the patient into a reverse Trendelenburg and the Eduar liver retractor was placed in the subxiphoid position through the use of a 5-mm trocar incision.    We then turned our attention to the gastroesophageal junction.  The left ariella was skeletonized dissecting at the angle of His.  The pars flaccida was identified and incised. The lesser sac was entered below the lesser curve at the level just inferior to the take off of the left gastric artery.  The left gastric artery and hepatic vagal branches were preserved.     We then created a 30 cc gastric pouch. To accomplish this, serial firings of a laparoscopic stapler 60-mm cartridge were utilized. The staple lines were reinforced with a butressing material. We created a pouch based on the lesser curve and in vertical orientation. This was accomplished by a  transverse firing of the stapler along the inferior edge of the pouch and then vertical serial firings of the stapler to the angle of His. This completely  the pouch from the gastric remnant. A 25 mm circular stapler anvil was passed through the mouth and into the esophagus and subsequently placed within the pouch.  The inferior edge of the pouch was then opened with cautery and the anvil stem was brought through the anterior aspect of the pouch close to the staple line. We proceeded to divide the omentum all the way to the transverse colon. The end of the Rajan limb was opened with the cautery and the circular stapling device was brought through the dilated left upper quadrant 12-mm  port site, and passed through the open end of the Rajan limb. The Rajan limb was then passed in a antecolic and antegastric position to the pouch. This was accomplished without tension and without twist.  The stem of the stapling device was then brought through the antimesenteric border of the Rajan limb adjacent to the pouch. The stem and the anvil were united and the stapler was fired. This created an end-to-side gastrojejunostomy. The excess Rajan limb proximal to the anastomosis was then resected with the cautery energy device and a laparoscopic stapler with a 60-mm cartridge.   The anastomosis was reinforced with interrupted absorbable sutures at the intersection of the staple lines. The distal Rajan limb was occluded and an EGD as well as an air insufflation test were performed. No intraoperative bleeding nor leaks were detected.     I then covered the G-J anastomosis with a tongue of omentum in a Meño patch fashion and secured it in place with a single 2/0 Vicryl stitch.    The sponge, needle and instrument count was reported complete.    The 12-mm port site on the left flank that was dilated for the circular stapler as well as the Visiport trocar were then closed with the use of a suture closure device and a 0 absorbable suture. The liver retractor was removed under direct laparoscopic visualization, and no bleeding was noted.   The remaining ports were then also removed under laparoscopic visualization. The skin incisions were all closed with 4-0 absorbable subcuticular suture. The patient tolerated the procedure well, was extubated uneventfully and was transferred to the recovery room in stable condition.    I was present for the entire length of the procedure as the attending of record.  No qualified resident was available to assist.  The presence of an assistant was necessary for camera holding, traction and counter traction and for help with suturing and stapling in addition to performing the  intraop-EGD.    Patient Disposition:    PACU     Signature: Leon Patricio MD  Date: July 7, 2025  Time: 11:37 AM

## 2025-07-07 NOTE — PLAN OF CARE
Problem: PAIN - ADULT  Goal: Verbalizes/displays adequate comfort level or baseline comfort level  Description: Interventions:  - Encourage patient to monitor pain and request assistance  - Assess pain using appropriate pain scale  - Administer analgesics as ordered based on type and severity of pain and evaluate response  - Implement non-pharmacological measures as appropriate and evaluate response  - Consider cultural and social influences on pain and pain management  - Notify physician/advanced practitioner if interventions unsuccessful or patient reports new pain  - Educate patient/family on pain management process including their role and importance of  reporting pain   - Provide non-pharmacologic/complimentary pain relief interventions  Outcome: Progressing     Problem: INFECTION - ADULT  Goal: Absence or prevention of progression during hospitalization  Description: INTERVENTIONS:  - Assess and monitor for signs and symptoms of infection  - Monitor lab/diagnostic results  - Monitor all insertion sites, i.e. indwelling lines, tubes, and drains  - Monitor endotracheal if appropriate and nasal secretions for changes in amount and color  - [place] appropriate cooling/warming therapies per order  - Administer medications as ordered  - Instruct and encourage patient and family to use good hand hygiene technique  - Identify and instruct in appropriate isolation precautions for identified infection/condition  Outcome: Progressing     Problem: SAFETY ADULT  Goal: Maintain or return to baseline ADL function  Description: INTERVENTIONS:  -  Assess patient's ability to carry out ADLs; assess patient's baseline for ADL function and identify physical deficits which impact ability to perform ADLs (bathing, care of mouth/teeth, toileting, grooming, dressing, etc.)  - Assess/evaluate cause of self-care deficits   - Assess range of motion  - Assess patient's mobility; develop plan if impaired  - Assess patient's need for  assistive devices and provide as appropriate  - Encourage maximum independence but intervene and supervise when necessary  - Involve family in performance of ADLs  - Assess for home care needs following discharge   - Consider OT consult to assist with ADL evaluation and planning for discharge  - Provide patient education as appropriate  - Monitor functional capacity and physical performance, use of AM PAC & JH-HLM   - Monitor gait, balance and fatigue with ambulation    Outcome: Progressing     Problem: DISCHARGE PLANNING  Goal: Discharge to home or other facility with appropriate resources  Description: INTERVENTIONS:  - Identify barriers to discharge w/patient and caregiver  - Arrange for needed discharge resources and transportation as appropriate  - Identify discharge learning needs (meds, wound care, etc.)  - Arrange for interpretive services to assist at discharge as needed  - Refer to Case Management Department for coordinating discharge planning if the patient needs post-hospital services based on physician/advanced practitioner order or complex needs related to functional status, cognitive ability, or social support system  Outcome: Progressing     Problem: Knowledge Deficit  Goal: Patient/family/caregiver demonstrates understanding of disease process, treatment plan, medications, and discharge instructions  Description: Complete learning assessment and assess knowledge base.  Interventions:  - Provide teaching at level of understanding  - Provide teaching via preferred learning methods  Outcome: Progressing

## 2025-07-07 NOTE — ANESTHESIA POSTPROCEDURE EVALUATION
Post-Op Assessment Note    CV Status:  Stable    Pain management: adequate       Mental Status:  Alert and awake   Hydration Status:  Euvolemic   PONV Controlled:  Controlled   Airway Patency:  Patent     Post Op Vitals Reviewed: Yes    No anethesia notable event occurred.    Staff: Anesthesiologist, CRNA           Last Filed PACU Vitals:  Vitals Value Taken Time   Temp 97.6 °F (36.4 °C) 07/07/25 12:50   Pulse 78 07/07/25 13:05   /61 07/07/25 12:51   Resp 16 07/07/25 12:50   SpO2 94 % 07/07/25 13:05   Vitals shown include unfiled device data.    Modified Jada:     Vitals Value Taken Time   Activity 2 07/07/25 12:50   Respiration 2 07/07/25 12:50   Circulation 2 07/07/25 12:50   Consciousness 1 07/07/25 12:50   Oxygen Saturation 2 07/07/25 12:50     Modified Jada Score: 9

## 2025-07-07 NOTE — INTERVAL H&P NOTE
H&P reviewed. After examining the patient I find no changes in the patients condition since the H&P had been written.    Vitals:    07/07/25 0753   BP: 119/67   Pulse: 97   Resp: 18   Temp: 97.6 °F (36.4 °C)   SpO2: 97%

## 2025-07-07 NOTE — ANESTHESIA POSTPROCEDURE EVALUATION
Post-Op Assessment Note    CV Status:  Stable    Pain management: adequate       Mental Status:  Sleepy and arousable   Hydration Status:  Euvolemic   PONV Controlled:  Controlled   Airway Patency:  Patent     Post Op Vitals Reviewed: Yes    No anethesia notable event occurred.    Staff: CRNA           Last Filed PACU Vitals:  Vitals Value Taken Time   Temp 97.6 °F (36.4 °C) 07/07/25 11:50   Pulse 84 07/07/25 11:52   /59 07/07/25 11:51   Resp 16 07/07/25 11:50   SpO2 94 % 07/07/25 11:52   Vitals shown include unfiled device data.

## 2025-07-08 VITALS
WEIGHT: 248.68 LBS | OXYGEN SATURATION: 99 % | BODY MASS INDEX: 41.43 KG/M2 | HEIGHT: 65 IN | SYSTOLIC BLOOD PRESSURE: 115 MMHG | HEART RATE: 78 BPM | DIASTOLIC BLOOD PRESSURE: 67 MMHG | RESPIRATION RATE: 17 BRPM | TEMPERATURE: 98.2 F

## 2025-07-08 LAB
ANION GAP SERPL CALCULATED.3IONS-SCNC: 7 MMOL/L (ref 4–13)
BUN SERPL-MCNC: 10 MG/DL (ref 5–25)
CALCIUM SERPL-MCNC: 9 MG/DL (ref 8.4–10.2)
CHLORIDE SERPL-SCNC: 107 MMOL/L (ref 96–108)
CO2 SERPL-SCNC: 22 MMOL/L (ref 21–32)
CREAT SERPL-MCNC: 0.73 MG/DL (ref 0.6–1.3)
ERYTHROCYTE [DISTWIDTH] IN BLOOD BY AUTOMATED COUNT: 14.4 % (ref 11.6–15.1)
GFR SERPL CREATININE-BSD FRML MDRD: 105 ML/MIN/1.73SQ M
GLUCOSE P FAST SERPL-MCNC: 104 MG/DL (ref 65–99)
GLUCOSE SERPL-MCNC: 104 MG/DL (ref 65–140)
HCT VFR BLD AUTO: 37.1 % (ref 34.8–46.1)
HGB BLD-MCNC: 12 G/DL (ref 11.5–15.4)
MCH RBC QN AUTO: 27.6 PG (ref 26.8–34.3)
MCHC RBC AUTO-ENTMCNC: 32.3 G/DL (ref 31.4–37.4)
MCV RBC AUTO: 86 FL (ref 82–98)
PLATELET # BLD AUTO: 305 THOUSANDS/UL (ref 149–390)
PMV BLD AUTO: 10.5 FL (ref 8.9–12.7)
POTASSIUM SERPL-SCNC: 4.1 MMOL/L (ref 3.5–5.3)
RBC # BLD AUTO: 4.34 MILLION/UL (ref 3.81–5.12)
SODIUM SERPL-SCNC: 136 MMOL/L (ref 135–147)
WBC # BLD AUTO: 12.49 THOUSAND/UL (ref 4.31–10.16)

## 2025-07-08 PROCEDURE — 99024 POSTOP FOLLOW-UP VISIT: CPT | Performed by: STUDENT IN AN ORGANIZED HEALTH CARE EDUCATION/TRAINING PROGRAM

## 2025-07-08 PROCEDURE — NC001 PR NO CHARGE: Performed by: PHYSICIAN ASSISTANT

## 2025-07-08 PROCEDURE — 80048 BASIC METABOLIC PNL TOTAL CA: CPT | Performed by: STUDENT IN AN ORGANIZED HEALTH CARE EDUCATION/TRAINING PROGRAM

## 2025-07-08 PROCEDURE — 85027 COMPLETE CBC AUTOMATED: CPT | Performed by: STUDENT IN AN ORGANIZED HEALTH CARE EDUCATION/TRAINING PROGRAM

## 2025-07-08 RX ORDER — TRAMADOL HYDROCHLORIDE 50 MG/1
50 TABLET ORAL EVERY 6 HOURS PRN
Qty: 5 TABLET | Refills: 0 | Status: SHIPPED | OUTPATIENT
Start: 2025-07-08

## 2025-07-08 RX ADMIN — ACETAMINOPHEN 1000 MG: 10 INJECTION INTRAVENOUS at 05:27

## 2025-07-08 RX ADMIN — SIMETHICONE 80 MG: 80 TABLET, CHEWABLE ORAL at 11:27

## 2025-07-08 RX ADMIN — SERTRALINE HYDROCHLORIDE 50 MG: 50 TABLET ORAL at 08:12

## 2025-07-08 RX ADMIN — MORPHINE SULFATE 4 MG: 4 INJECTION INTRAVENOUS at 08:14

## 2025-07-08 RX ADMIN — OXYCODONE HYDROCHLORIDE 10 MG: 5 SOLUTION ORAL at 10:40

## 2025-07-08 RX ADMIN — TOPIRAMATE 50 MG: 25 TABLET, FILM COATED ORAL at 08:12

## 2025-07-08 RX ADMIN — ACETAMINOPHEN 1000 MG: 10 INJECTION INTRAVENOUS at 11:31

## 2025-07-08 RX ADMIN — FAMOTIDINE 20 MG: 10 INJECTION, SOLUTION INTRAVENOUS at 08:13

## 2025-07-08 RX ADMIN — SIMETHICONE 80 MG: 80 TABLET, CHEWABLE ORAL at 00:47

## 2025-07-08 RX ADMIN — OXYCODONE HYDROCHLORIDE 10 MG: 5 SOLUTION ORAL at 02:04

## 2025-07-08 RX ADMIN — OXYCODONE HYDROCHLORIDE 10 MG: 5 SOLUTION ORAL at 06:05

## 2025-07-08 NOTE — PLAN OF CARE
Problem: PAIN - ADULT  Goal: Verbalizes/displays adequate comfort level or baseline comfort level  Description: Interventions:  - Encourage patient to monitor pain and request assistance  - Assess pain using appropriate pain scale  - Administer analgesics as ordered based on type and severity of pain and evaluate response  - Implement non-pharmacological measures as appropriate and evaluate response  - Consider cultural and social influences on pain and pain management  - Notify physician/advanced practitioner if interventions unsuccessful or patient reports new pain  - Educate patient/family on pain management process including their role and importance of  reporting pain   - Provide non-pharmacologic/complimentary pain relief interventions  Outcome: Progressing     Problem: INFECTION - ADULT  Goal: Absence or prevention of progression during hospitalization  Description: INTERVENTIONS:  - Assess and monitor for signs and symptoms of infection  - Monitor lab/diagnostic results  - Monitor all insertion sites, i.e. indwelling lines, tubes, and drains  - Monitor endotracheal if appropriate and nasal secretions for changes in amount and color  - [place] appropriate cooling/warming therapies per order  - Administer medications as ordered  - Instruct and encourage patient and family to use good hand hygiene technique  - Identify and instruct in appropriate isolation precautions for identified infection/condition  Outcome: Progressing     Problem: SAFETY ADULT  Goal: Maintain or return to baseline ADL function  Description: INTERVENTIONS:  -  Assess patient's ability to carry out ADLs; assess patient's baseline for ADL function and identify physical deficits which impact ability to perform ADLs (bathing, care of mouth/teeth, toileting, grooming, dressing, etc.)  - Assess/evaluate cause of self-care deficits   - Assess range of motion  - Assess patient's mobility; develop plan if impaired  - Assess patient's need for  assistive devices and provide as appropriate  - Encourage maximum independence but intervene and supervise when necessary  - Involve family in performance of ADLs  - Assess for home care needs following discharge   - Consider OT consult to assist with ADL evaluation and planning for discharge  - Provide patient education as appropriate  - Monitor functional capacity and physical performance, use of AM PAC & JH-HLM   - Monitor gait, balance and fatigue with ambulation    Outcome: Progressing     Problem: DISCHARGE PLANNING  Goal: Discharge to home or other facility with appropriate resources  Description: INTERVENTIONS:  - Identify barriers to discharge w/patient and caregiver  - Arrange for needed discharge resources and transportation as appropriate  - Identify discharge learning needs (meds, wound care, etc.)  - Arrange for interpretive services to assist at discharge as needed  - Refer to Case Management Department for coordinating discharge planning if the patient needs post-hospital services based on physician/advanced practitioner order or complex needs related to functional status, cognitive ability, or social support system  Outcome: Progressing     Problem: Knowledge Deficit  Goal: Patient/family/caregiver demonstrates understanding of disease process, treatment plan, medications, and discharge instructions  Description: Complete learning assessment and assess knowledge base.  Interventions:  - Provide teaching at level of understanding  - Provide teaching via preferred learning methods  Outcome: Progressing

## 2025-07-08 NOTE — PROGRESS NOTES
"Progress Note - Bariatric Surgery   Brittneythuy Heart 39 y.o. female MRN: [id number]  Unit/Bed#: E5 -01 Encounter: [id number]      Subjective/Objective     Subjective:  Patient POD 1 s/p RYGB .      Patient denies fevers, chills, sweats, SOB, CP, calf pain.  Pain adequately controlled on oral pain medication.  Ambulating without assistance, voiding well, and using incentive spirometer.  Patient tolerating liquid diet without nausea or vomiting today.  Vital signs stable.  CBC today shows nl.  BMP obtained today nl.    Objective:    /69 (BP Location: Left arm)   Pulse 78   Temp 97.9 °F (36.6 °C) (Oral)   Resp 17   Ht 5' 5\" (1.651 m)   Wt 113 kg (248 lb 10.9 oz)   SpO2 99%   BMI 41.38 kg/m²       Intake/Output Summary (Last 24 hours) at 7/8/2025 1008  Last data filed at 7/7/2025 1901  Gross per 24 hour   Intake 1200 ml   Output 420 ml   Net 780 ml       Invasive Devices       Peripheral Intravenous Line  Duration             Peripheral IV 07/08/25 Proximal;Right;Ventral (anterior) Forearm <1 day                    ROS: 10-point system completed. All negative except see HPI.    Physical Exam    General Appearance:    Alert, cooperative, no distress, appears stated age   Head:    Normocephalic, without obvious abnormality, atraumatic   Lungs:     Respirations unlabored   Heart:    Regular rate and rhythm   Abdomen:     Soft, appropriate tenderness, no masses, no organomegaly, non-distended   Extremities:   Extremities normal, atraumatic, no cyanosis or edema   Neurologic:  Incision:  Psych:   Normal strength and sensation    Clean, dry, and intact, no bleeding, no drain    Normal mood and affect       Lab, Imaging and other studies:I have personally reviewed pertinent lab results.       VTE Mechanical Prophylaxis: sequential compression device    Assessment/Plan  1)  Patient with Obesity s/p above procedure with stable post op course.  Patient afebrile and hemodynamically stable.     - Encourage PO " fluids   - Recommend ambulation, use of SCDs when not ambulating, and incentive spirometry.   -  Complete antibiotic course  - Plan to D/C patient home today pending anticipated progression  - DVT 0.2272%    Plan of care was discussed with patient.  Care plan discussed with Dr. Sheng Seals MD  Bariatric Surgery  7/8/2025  10:08 AM

## 2025-07-08 NOTE — DISCHARGE SUMMARY
Discharge Summary - Brittney Heart 39 y.o. female MRN: [id number]    Unit/Bed#: E5 -01 Encounter: [id number]      Pre-Operative Diagnosis: Pre-Op Diagnosis Codes:      * Obesity, Class III, BMI 40-49.9 (morbid obesity) [E66.813]     * Esophageal reflux [K21.9]    Post-Operative Diagnosis: Post-Op Diagnosis Codes:     * Obesity, Class III, BMI 40-49.9 (morbid obesity) [E66.813]     * Esophageal reflux [K21.9]    Procedures Performed:  Procedure(s):  BYPASS GASTRIC  LASHELL-EN-Y LAPAROSCOPIC WITH INTRAOPERATIVE EGD    Surgeon: Leon Patricio MD    See H & P for full details of admission and Operative Note for full details of operations performed.     Patient tolerated surgery well without complications. In the morning postoperative Day 1, the patient had mild nausea and abdominal pain. Tolerated a clear liquid diet without vomiting. Able to ambulate and voiding independently. Patient was deemed ready for discharge home. No lovenox per VTE calculator.    Patient was seen and examined prior to discharge.      Provisions for Follow-Up Care:  See After Visit Summary/Discharge Instructions for information related to follow-up care and home orders.      Disposition: Home, in stable condition.     Planned Readmission: No    Discharge Medications:  See After Visit Summary/Discharge Instructions for reconciled discharge medications provided to patient and family.      Post Operative instructions: Reviewed with patient and/or family.    Signature:   Kristy Coulter PA-C  Date: 7/8/2025 Time: 9:38 AM

## 2025-07-08 NOTE — DISCHARGE INSTR - AVS FIRST PAGE
Bariatric/Weight Loss Surgery  Hospital Discharge Instructions  ACTIVITY:  Progress as feels comfortable - a good rule is:  if you are doing something and it begins to hurt, stop doing the activity. Walk every hour while at home.  You may walk stairs if you do so slowly  You may shower 48 hours after surgery.  Do not scrub incision sites.  Blot gently with clean towel to dry incisions. (see #4 below)   Use your incentive spirometer 10 times per hour while awake for 1 week after surgery.  Do NOT drive for 48 hours after surgery. No driving 24 hours after taking certain prescription pain medications. Examples of such medication are Percocet, Darvocet, Oxycodone, Tylenol #3, and Tylenol with Codeine.     DIET  Bariatric patients: Stay on a liquid diet for 7 days after your surgery date, sipping slowly. Refer to your manual for examples of choices. Remember to keep your liquids sugar free or low calorie. You may have protein drinks. Make sure to drink 48 to 64 ounces per day of fluids. You may advance to a pureed diet one week after surgery as instructed by your diet progression pamphlet. Once you get approval from your surgeon at your first post operative visit, you may advance to the soft diet and remain on soft diet for 8 weeks unless otherwise instructed.  Hernia patients: Hernia diet as instructed    MEDICATIONS:  The abdominal nerve block will wear off during the first 1-2 days that you are home, and you may become sore (especially over incision site/sites where abdominal wall is sutured). This may create a pulling sensation, especially while moving around, and will fade over time.  Continue to take your Tylenol and your pain medication as instructed.   Bariatric patients: Start vitamins and minerals one week after surgery or when you start stage 3/puree diet.   Anti-acid Medication as per prescription.  Other medications as indicated on the Physician Patient Discharge Instructions form given to you at the time of  discharge.  You will need to consult with your Family Doctor in regards to all your prescribed medication, particularly those for blood pressure and diabetes.  As you lose weight, medical conditions may change, requiring an alteration or elimination of the drug dose. Monitor blood pressure closely and call PCP with any concerns.   Lovenox injections daily ONLY if indicated   Females: DO NOT TAKE BIRTH CONTROL(BC) MEDICATIONS, INSERT BC VAGINAL RINGS, OR PLACE IUD OR ANY OTHER BC METHODS UNTIL 31 DAYS FROM DAY OF DISCHARGE FROM HOSPITAL. THIS PLACES YOU AT HIGH RISK FOR A POTENTIALLY LIFE THREATENING BLOOD CLOT. Remember to always use barrier methods for birth control and speak to your GYN about using two forms of birth control to start 31 days after surgery. It is very important to avoid pregnancy until at least 18-24 months after surgery.     INCISION CARE  You may shower and get incisions wet 2 days after surgery. No soaking tub baths or swimming for 30 days after surgery. Keep abdominal area and incisions clean. Use soap and water to create a good lather and rinse off.  Do not scrub incisions.   If you have a drain, empty the drain as the nurses instructed.    FOLLOW-UP APPOINTMENT should be made for one week after discharge. Call surgeon’s office at [phone] to schedule an appointment.    CALL YOUR DOCTOR FOR:  pain not controlled by pain medications, a temperature greater than 101.5° F, any increase or change in drainage or redness from any incision, any vomiting or inability to keep liquids down, shortness of breath, shoulder pain, or bleeding

## 2025-07-08 NOTE — PLAN OF CARE
Problem: PAIN - ADULT  Goal: Verbalizes/displays adequate comfort level or baseline comfort level  Description: Interventions:  - Encourage patient to monitor pain and request assistance  - Assess pain using appropriate pain scale  - Administer analgesics as ordered based on type and severity of pain and evaluate response  - Implement non-pharmacological measures as appropriate and evaluate response  - Consider cultural and social influences on pain and pain management  - Notify physician/advanced practitioner if interventions unsuccessful or patient reports new pain  - Educate patient/family on pain management process including their role and importance of  reporting pain   - Provide non-pharmacologic/complimentary pain relief interventions  Outcome: Progressing     Problem: INFECTION - ADULT  Goal: Absence or prevention of progression during hospitalization  Description: INTERVENTIONS:  - Assess and monitor for signs and symptoms of infection  - Monitor lab/diagnostic results  - Monitor all insertion sites, i.e. indwelling lines, tubes, and drains  - Monitor endotracheal if appropriate and nasal secretions for changes in amount and color  - [place] appropriate cooling/warming therapies per order  - Administer medications as ordered  - Instruct and encourage patient and family to use good hand hygiene technique  - Identify and instruct in appropriate isolation precautions for identified infection/condition  Outcome: Progressing     Problem: SAFETY ADULT  Goal: Maintain or return to baseline ADL function  Description: INTERVENTIONS:  -  Assess patient's ability to carry out ADLs; assess patient's baseline for ADL function and identify physical deficits which impact ability to perform ADLs (bathing, care of mouth/teeth, toileting, grooming, dressing, etc.)  - Assess/evaluate cause of self-care deficits   - Assess range of motion  - Assess patient's mobility; develop plan if impaired  - Assess patient's need for  assistive devices and provide as appropriate  - Encourage maximum independence but intervene and supervise when necessary  - Involve family in performance of ADLs  - Assess for home care needs following discharge   - Consider OT consult to assist with ADL evaluation and planning for discharge  - Provide patient education as appropriate  - Monitor functional capacity and physical performance, use of AM PAC & JH-HLM   - Monitor gait, balance and fatigue with ambulation    Outcome: Progressing     Problem: DISCHARGE PLANNING  Goal: Discharge to home or other facility with appropriate resources  Description: INTERVENTIONS:  - Identify barriers to discharge w/patient and caregiver  - Arrange for needed discharge resources and transportation as appropriate  - Identify discharge learning needs (meds, wound care, etc.)  - Arrange for interpretive services to assist at discharge as needed  - Refer to Case Management Department for coordinating discharge planning if the patient needs post-hospital services based on physician/advanced practitioner order or complex needs related to functional status, cognitive ability, or social support system  Outcome: Progressing     Problem: Knowledge Deficit  Goal: Patient/family/caregiver demonstrates understanding of disease process, treatment plan, medications, and discharge instructions  Description: Complete learning assessment and assess knowledge base.  Interventions:  - Provide teaching at level of understanding  - Provide teaching via preferred learning methods  Outcome: Progressing

## 2025-07-09 ENCOUNTER — TELEPHONE (OUTPATIENT)
Dept: MEDSURG UNIT | Facility: HOSPITAL | Age: 40
End: 2025-07-09

## 2025-07-09 NOTE — TELEPHONE ENCOUNTER
Post op follow up phone call completed.  Pt is sipping liquids and has consumed about 20 oz so far today.  Using IS as instructed, reinforced importance of using IS to help prevent pneumonia. Ambulating about home without difficulty.  Feeling very sore, taking tylenol as prescribed, 1000mg q8h, using tramadol intermittently.  Reaffirmed examples of liquid diet over the next week.  Started miralax, passing gas, no BM yet.  Pt stated understanding about discharge instructions and medication adjustments.  Follow up appt with surgeon scheduled for next week.   Instructed to call with any additional questions or concerns.

## 2025-07-12 ENCOUNTER — NURSE TRIAGE (OUTPATIENT)
Dept: OTHER | Facility: OTHER | Age: 40
End: 2025-07-12

## 2025-07-12 NOTE — TELEPHONE ENCOUNTER
Reason for Disposition  • [1] SEVERE post-op pain (e.g., excruciating, pain scale 8-10) AND [2] not controlled with pain medications    Protocols used: Post-Op Symptoms and Questions-Adult-AH

## 2025-07-12 NOTE — TELEPHONE ENCOUNTER
"Regarding: post- surgery pain- 7-8/10, hot flashes, dizzy  ----- Message from Ignacio IBARRA sent at 7/12/2025 12:44 PM EDT -----  Patient's spouse stated, \"My wife has a biatric surgery on 7/8/25 and she is in pain right now -7-8/10. Her pain is located on left side of her abd. She is also experiencing hot flashes and dizziness.\"    "

## 2025-07-12 NOTE — TELEPHONE ENCOUNTER
"REASON FOR CONVERSATION: Post-op Problem    SYMPTOMS: post op bariatric surgery 7/7; left sided abd pain 7-8/10; intermittent hot flashes and dizziness  Tramadol last taken 2 days ago; tylenol yesterday  Tolerating fluids well    OTHER HEALTH INFORMATION: N/A    PROTOCOL DISPOSITION: Call PCP Now,  Now    CARE ADVICE PROVIDED: On call provider confirmed symptoms consistent with normal post op recovery.  Info relayed to patient's , advised maintaining tylenol on a schedule and using the tramadol as needed.    PRACTICE FOLLOW-UP: N/A          Answer Assessment - Initial Assessment Questions  1. SYMPTOM: \"What's the main symptom you're concerned about?\" (e.g., pain, fever, vomiting)        Left side abd pain 7-8/10; over incision site    Last dose of tramadol 2 days    Hot flashes but no fever as well as dizziness - onset of both yesterday    2. ONSET: \"When did pain  start?\"        Since post op period    3. SURGERY: \"What surgery did you have?\"        Bariatric    4. DATE of SURGERY: \"When was the surgery?\"       7/7    8. FEVER: \"Do you have a fever?\" If Yes, ask: \"What is your temperature, how was it measured, and when did it start?\"        Fever    9. VOMITING: \"Is there any vomiting?\" If Yes, ask: \"How many times?\"        Denies    10. BLEEDING: \"Is there any bleeding?\" If Yes, ask: \"How much?\" and \"Where?\"          Denies    11. OTHER SYMPTOMS: \"Do you have any other symptoms?\" (e.g., drainage from wound, painful urination, constipation)          Is able to sip fluids, urinating every hour  Has had several bowel movement    Protocols used: Post-Op Symptoms and Questions-Adult-AH    "

## 2025-07-14 ENCOUNTER — TELEPHONE (OUTPATIENT)
Age: 40
End: 2025-07-14

## 2025-07-14 ENCOUNTER — NURSE TRIAGE (OUTPATIENT)
Age: 40
End: 2025-07-14

## 2025-07-14 DIAGNOSIS — E66.813 OBESITY, CLASS III, BMI 40-49.9 (MORBID OBESITY): ICD-10-CM

## 2025-07-14 NOTE — TELEPHONE ENCOUNTER
Medication: tramadol    Dose/Frequency: take 1 tablet (50 mg total) by mouth every 6 (six) hours as needed for moderate pain    Quantity: 5    Pharmacy: [facility]/pharmacy #[id number] - [place] - [address]     Office:   [] PCP/Provider -   [x] Speciality/Provider - bariatrics, LINETTE Coulter PA-C    Does the patient have enough for 3 days?   [] Yes   [x] No - Send as HP to post-op day, per weight management protocol, no medication requests are to be sent as HP

## 2025-07-14 NOTE — TELEPHONE ENCOUNTER
Patient's  called stating patient had surgery and is in pain. Transferred to [facility].

## 2025-07-14 NOTE — TELEPHONE ENCOUNTER
"REASON FOR CONVERSATION: Post-op Problem    SYMPTOMS: L-sided abd pain exacerbated with moving since surgery. Denies N&V, +BM's (loose). Ran out of tramadol yesterday, taking extra strength tylenol, with relief. Started pureed diet today without issues.    Also c/o \"tightness\" and \"stiffness\" L back radiating to hip, exacerbated with standing x3 days. Endorses relief with sitting, heating pad, tramadol/tylenol PRN. Denies urinary complaints.     PT asking if this is normal. PT also ran out of tramadol yesterday, asking if she can have more prior to next office visit on 7/17/25.    OTHER HEALTH INFORMATION: post-op day 7 Procedure: BYPASS GASTRIC  LASHELL-EN-Y LAPAROSCOPIC WITH INTRAOPERATIVE EGD (Abdomen) with Dr. Patricio.    PROTOCOL DISPOSITION: Home Care    CARE ADVICE PROVIDED: Reassurance provided to PT that L-sided abd pain is common and may be r/t large internal suture. PT reports that that's what Dr. Patricio told her in the hospital.     Also advised that back pain may be associated with L-sided abd pain. Reassurance provided that pain is reproducible and relieved with heat & pain medication.     Will request refill in different encounter    PT verbalized understanding and agreeable to plan. PT will call back with any new or worsening sxs.    PRACTICE FOLLOW-UP: post-op 7/17/25    Reason for Disposition   Other post-op symptom or question    Answer Assessment - Initial Assessment Questions  1. SYMPTOM: \"What's the main symptom you're concerned about?\" (e.g., pain, fever, vomiting)      L-sided abd pain & back pain  2. ONSET: \"When did pain  start?\"      L-sided abd pain since surgery; back pain x3 days  3. SURGERY: \"What surgery did you have?\"      Procedure: BYPASS GASTRIC  LASHELL-EN-Y LAPAROSCOPIC WITH INTRAOPERATIVE EGD (Abdomen)  4. DATE of SURGERY: \"When was the surgery?\"       7/7/25  5. ANESTHESIA: \"What type of anesthesia did you have?\" (e.g., general, spinal, epidural, local)      general  6. DRAINS: \"Were " "any drains place in or around the wound?\" (e.g., Hemovac, Braulio-Vargas, [place])      no  7. PAIN: \"Is there any pain?\" If Yes, ask: \"How bad is it?\"  (Scale 1-10; or mild, moderate, severe)      back pain exacerbated with standing; abd pain exacerbated with moving  8. FEVER: \"Do you have a fever?\" If Yes, ask: \"What is your temperature, how was it measured, and when did it start?\"      \"Hot flashes\"  9. VOMITING: \"Is there any vomiting?\" If Yes, ask: \"How many times?\"      no  10. BLEEDING: \"Is there any bleeding?\" If Yes, ask: \"How much?\" and \"Where?\"        no  11. OTHER SYMPTOMS: \"Do you have any other symptoms?\" (e.g., drainage from wound, painful urination, constipation)        no    Protocols used: Post-Op Symptoms and Questions-Adult-[place]    "

## 2025-07-15 RX ORDER — TRAMADOL HYDROCHLORIDE 50 MG/1
50 TABLET ORAL EVERY 6 HOURS PRN
Qty: 5 TABLET | Refills: 0 | OUTPATIENT
Start: 2025-07-15

## 2025-07-16 ENCOUNTER — NURSE TRIAGE (OUTPATIENT)
Age: 40
End: 2025-07-16

## 2025-07-16 NOTE — TELEPHONE ENCOUNTER
"REASON FOR CONVERSATION: Post-op Problem    SYMPTOMS: 1) x1 day intermittent 5/10 \"pressure\" \"discomfort\" in between breasts, \"feels like something is sitting in throat.\" +30/60 & tolerating food/fluid/protein shakes well, -fevers/chills, -N&V. Taking omeprazole. Reports sxs are relieved when standing. PT speaking in clear complete sentences throughout call; no wheezing/stridor noted.   2) intermittent lightheadedness (feels off balance) since hospitalization. Exacerbated with turning head too fast. Denies CP/palpitations/rapid heart rate/syncope. Reports episodes occur approx every other day, but noticed that it has been occurring to today. PT also reports that she has increased activity level today for first time since surgery. PT also reports that she was previously told while in hospital that she may experience intermittent lightheadedness r/t diet.    PT has post-op appointment tomorrow but is anxious to wait until then to discuss Sx.     OTHER HEALTH INFORMATION: post-op day 9 Procedure: BYPASS GASTRIC  LASHELL-EN-Y LAPAROSCOPIC WITH INTRAOPERATIVE EGD (Abdomen) with Dr. Patricio.     PROTOCOL DISPOSITION: See Today or Tomorrow in Office    CARE ADVICE PROVIDED: 1) reassurance provided to PT and advised sxs may be r/t typical post-op swelling. Encouraged PT to continue diet/medications. Discussed worsening sxs (I.e. stricture).   2) reassurance provided to PT and advised that sxs may be r/t diet as well as increasing activity level. Advised PT to rest PRN and continue diet/fluids.     PT verbalized understanding and agreeable to plan. PT will call back with any new or worsening sxs.    PRACTICE FOLLOW-UP: post-op appointment tomorrow with Dr. Patricio & RD    Reason for Disposition   Patient wants to be seen    Answer Assessment - Initial Assessment Questions  1. SYMPTOM: \"What's the main symptom you're concerned about?\" (e.g., pain, fever, vomiting)      Midsternal chest discomfort & lightheadedness  2. ONSET: \"When " "did sxs  start?\"      Chest discomfort since yesterday & lightheadedness since surgery  3. SURGERY: \"What surgery did you have?\"      Procedure: BYPASS GASTRIC  LASHELL-EN-Y LAPAROSCOPIC WITH INTRAOPERATIVE EGD (Abdomen)  4. DATE of SURGERY: \"When was the surgery?\"       7/7/25  5. ANESTHESIA: \"What type of anesthesia did you have?\" (e.g., general, spinal, epidural, local)      general  6. DRAINS: \"Were any drains place in or around the wound?\" (e.g., Hemovac, Braulio-Vargas, Penrose)      no  7. PAIN: \"Is there any pain?\" If Yes, ask: \"How bad is it?\"  (Scale 1-10; or mild, moderate, severe)      Intermittent 5/10 CP  8. FEVER: \"Do you have a fever?\" If Yes, ask: \"What is your temperature, how was it measured, and when did it start?\"      no  9. VOMITING: \"Is there any vomiting?\" If Yes, ask: \"How many times?\"      no  10. BLEEDING: \"Is there any bleeding?\" If Yes, ask: \"How much?\" and \"Where?\"        no  11. OTHER SYMPTOMS: \"Do you have any other symptoms?\" (e.g., drainage from wound, painful urination, constipation)        no    Protocols used: Post-Op Symptoms and Questions-Adult-[facility]    "

## 2025-07-17 ENCOUNTER — OFFICE VISIT (OUTPATIENT)
Dept: BARIATRICS | Facility: CLINIC | Age: 40
End: 2025-07-17

## 2025-07-17 ENCOUNTER — CLINICAL SUPPORT (OUTPATIENT)
Dept: BARIATRICS | Facility: CLINIC | Age: 40
End: 2025-07-17

## 2025-07-17 ENCOUNTER — TELEPHONE (OUTPATIENT)
Dept: BARIATRICS | Facility: CLINIC | Age: 40
End: 2025-07-17

## 2025-07-17 VITALS
DIASTOLIC BLOOD PRESSURE: 74 MMHG | HEART RATE: 127 BPM | OXYGEN SATURATION: 99 % | TEMPERATURE: 97.9 F | WEIGHT: 235.5 LBS | HEIGHT: 65 IN | SYSTOLIC BLOOD PRESSURE: 126 MMHG | BODY MASS INDEX: 39.24 KG/M2

## 2025-07-17 DIAGNOSIS — Z98.84 S/P GASTRIC BYPASS: Primary | ICD-10-CM

## 2025-07-17 DIAGNOSIS — Z98.84 BARIATRIC SURGERY STATUS: Primary | ICD-10-CM

## 2025-07-17 PROCEDURE — RECHECK: Performed by: DIETITIAN, REGISTERED

## 2025-07-17 PROCEDURE — 99024 POSTOP FOLLOW-UP VISIT: CPT | Performed by: SURGERY

## 2025-07-17 NOTE — PROGRESS NOTES
Weight Management Nutrition Note      Bariatric Surgeon: Dr. Leon Patricio    Surgery: Gastric Bypass Laparoscopic    Class: first post op note    Topics discussed today include:     fluid goals post op, protein goals post op, constipation, chew food well, PPI use, diet progression, protein supplems, vitamin/mineral supplements, calcium supplements, additional vitamin B12, iron supplements, and fat soluble vitamins     Patient was able to verbalize basic diet (protein, fluid, vitamin and mineral) recommendations and possible nutrition-related complications. Yes

## 2025-07-17 NOTE — PROGRESS NOTES
Date of surgery:7/7/2025  Procedure:RNY  Performing surgeon:Dr. Patricio    Initial Weight - 252.0 Ibs  Current Weight -  235.5 Ibs  Scott Weight - 235.5 Ibs  Total Body Weight Loss (EWL)- 16.5  Ibs  EWL% - 16  %  TWB % - 7 %    For *NEW/TRANSFER* patients only: Who referred the patient? Please provide name and specialty (PCP, GI, etc.).

## 2025-07-17 NOTE — PROGRESS NOTES
"  POST OP UP VISIT - BARIATRIC SURGERY  Brittney Heart 39 y.o. female MRN: [id number]  Unit/Bed#:  Encounter: [id number]      HPI:  Brittney Heart is a 39 y.o. female status post laparoscopic Rajan-en-Y gastric bypass on July 7, 2025 here for first postop appointment.      Review of Systems   All other systems reviewed and are negative.      Historical Information   Past Medical History[1]  Past Surgical History[2]  Social History   Social History     Substance and Sexual Activity   Alcohol Use Never     Social History     Substance and Sexual Activity   Drug Use Never     Tobacco Use History[3]  Family History: Family history non-contributory    Meds/Allergies   all medications and allergies reviewed  Allergies[4]    Objective       Current Vitals:   Temp Source: Tympanic (07/17/25 1043)  Height: 5' 5\" (165.1 cm) (07/17/25 1043)      Invasive Devices       None                   Physical Exam  Vitals reviewed.   Constitutional:       General: She is not in acute distress.     Appearance: She is well-developed. She is not diaphoretic.   HENT:      Head: Normocephalic and atraumatic.      Right Ear: External ear normal.      Left Ear: External ear normal.     Eyes:      General: No scleral icterus.        Right eye: No discharge.         Left eye: No discharge.      Conjunctiva/sclera: Conjunctivae normal.     Abdominal:      General: Bowel sounds are normal. There is no distension.      Palpations: Abdomen is soft. There is no mass.      Tenderness: There is no abdominal tenderness. There is no guarding or rebound.      Comments: Abdomen is obese, soft and benign.  Laparoscopic incisions are healing well without any evidence of infection.  Mild ecchymosis at the sites.       Neurological:      Mental Status: She is alert and oriented to person, place, and time.     Psychiatric:         Behavior: Behavior normal.         Thought Content: Thought content normal.         Judgment: Judgment normal. "             Pathology, and Other Studies: Results Review Statement: No pertinent imaging studies reviewed.      Assessment/PLAN:    39 y.o. female status post laparoscopic Lashell-en-Y gastric bypass on July 7, 2025.    Doing well post op. No major issues.  Some incisional pain on the left side.    Increase physical activity as tolerated and as instructed.  Advance diet as instructed by our dietitians today and as indicated in the binder.    Follow up in one month as scheduled.              Leon Patricio MD  7/17/2025  10:48 AM           [1]   Past Medical History:  Diagnosis Date    Anxiety 1999    Asthma     Concussion 2009    Depression 1999    GERD (gastroesophageal reflux disease)     Motion sickness     Pneumonia 2024   [2]   Past Surgical History:  Procedure Laterality Date    CHOLECYSTECTOMY  05/2023    EGD      GALLBLADDER SURGERY  05/30/2023    IL LAPS GSTR RSTCV PX W/BYP LASHELL-EN-Y LIMB <150 CM N/A 7/7/2025    Procedure: BYPASS GASTRIC  LASHELL-EN-Y LAPAROSCOPIC WITH INTRAOPERATIVE EGD;  Surgeon: Leon Patricio MD;  Location: [facility];  Service: Bariatrics    WISDOM TOOTH EXTRACTION     [3]   Social History  Tobacco Use   Smoking Status Former    Types: Cigarettes    Passive exposure: Past   Smokeless Tobacco Never   Tobacco Comments    Social smoker quit 2023   [4]   Allergies  Allergen Reactions    Oxycodone-Acetaminophen Vomiting and Headache     Sweats, dizzy    Penicillin G Rash

## 2025-07-21 ENCOUNTER — TELEPHONE (OUTPATIENT)
Age: 40
End: 2025-07-21

## 2025-07-21 NOTE — TELEPHONE ENCOUNTER
REASON FOR CONVERSATION: Advice Only    SYMPTOMS: PT asking when she may submerge into body of water.     OTHER HEALTH INFORMATION: post-op day 14 Procedure: BYPASS GASTRIC  LASHELL-EN-Y LAPAROSCOPIC WITH INTRAOPERATIVE EGD (Abdomen) with Dr. Patricio.    PROTOCOL DISPOSITION: Information or Advice Only Call    CARE ADVICE PROVIDED: may not submerge into water until incisions are healed (e.g. glue/scabs have fallen off). PT verbalized understanding and agreeable to plan. PT will call back with any new questions or concerns.    PRACTICE FOLLOW-UP: RD 8/22/25

## 2025-07-25 ENCOUNTER — NURSE TRIAGE (OUTPATIENT)
Dept: BARIATRICS | Facility: CLINIC | Age: 40
End: 2025-07-25

## 2025-07-25 NOTE — PATIENT COMMUNICATION
PT called in. PT s/p Procedure: BYPASS GASTRIC  LASHELL-EN-Y LAPAROSCOPIC WITH INTRAOPERATIVE EGD (Abdomen) with Dr. Patricio on 7/7/25.     Reports noticing thick and also thin yellow/clear non-odorous drainage coming from L abd incision approx 30 min ago. Denies acute pain, N&V, fevers/chills, bleeding. Photo sent via [facility]. Reports some redness around incision, but endorses it is dry skin; denies warmth.     Advised PT to keep incision clean/dry. May cover with gauze to protect clothing. PT verbalized understanding and agreeable to plan. PT will call back with any new or worsening sxs.    Call back: [phone], or message back

## 2025-07-25 NOTE — TELEPHONE ENCOUNTER
"Answer Assessment - Initial Assessment Questions  1. SYMPTOM: \"What's the main symptom you're concerned about?\" (e.g., drainage, incision opened up, pain, redness)      drainage  2. ONSET: \"When did drainage  start?\"      30 min ago  3. SURGERY: \"What surgery did you have?\"      Procedure: BYPASS GASTRIC  LASHELL-EN-Y LAPAROSCOPIC WITH INTRAOPERATIVE EGD (Abdomen)  4. DATE of SURGERY: \"When was the surgery?\"       7/7/25  5. INCISION SITE: \"Where is the incision located?\"       L abd  6. REDNESS: \"Is there any redness at the incision site?\" If Yes, ask: \"How wide across is the redness?\" (Inches, centimeters)       yes  7. PAIN: \"Is there any pain?\" If Yes, ask: \"How bad is it?\"  (Scale 1-10; or mild, moderate, severe)      moderate  8. BLEEDING: \"Is there any bleeding?\" If Yes, ask: \"How much?\" and \"Where?\"      no  9. DRAINAGE: \"Is there any drainage from the incision site?\" If Yes, ask: \"What color and how much?\" (e.g., red, cloudy, pus; drops, teaspoon)      Yes, yellow  10. FEVER: \"Do you have a fever?\" If Yes, ask: \"What is your temperature, how was it measured, and when did it start?\"        no  11. OTHER SYMPTOMS: \"Do you have any other symptoms?\" (e.g., dizziness, rash elsewhere on body, shaking chills, weakness)        no    Protocols used: Post-Op Incision Symptoms and Questions-Adult-[facility]    "

## 2025-08-02 ENCOUNTER — HOSPITAL ENCOUNTER (EMERGENCY)
Facility: HOSPITAL | Age: 40
Discharge: HOME | End: 2025-08-02
Admitting: SURGERY
Payer: COMMERCIAL

## 2025-08-02 ENCOUNTER — NURSE TRIAGE (OUTPATIENT)
Dept: OTHER | Facility: OTHER | Age: 40
End: 2025-08-02

## 2025-08-02 PROBLEM — Z51.89 VISIT FOR WOUND CHECK: Status: ACTIVE | Noted: 2025-08-02

## 2025-08-07 ENCOUNTER — NURSE TRIAGE (OUTPATIENT)
Dept: BARIATRICS | Facility: CLINIC | Age: 40
End: 2025-08-07

## 2025-08-11 ENCOUNTER — TELEPHONE (OUTPATIENT)
Dept: BARIATRICS | Facility: CLINIC | Age: 40
End: 2025-08-11

## 2025-08-11 ENCOUNTER — OFFICE VISIT (OUTPATIENT)
Dept: BARIATRICS | Facility: CLINIC | Age: 40
End: 2025-08-11

## 2025-08-18 ENCOUNTER — TELEPHONE (OUTPATIENT)
Age: 40
End: 2025-08-18

## 2025-08-22 ENCOUNTER — CLINICAL SUPPORT (OUTPATIENT)
Dept: BARIATRICS | Facility: CLINIC | Age: 40
End: 2025-08-22

## 2025-08-22 DIAGNOSIS — Z98.84 S/P GASTRIC BYPASS: ICD-10-CM

## 2025-08-22 DIAGNOSIS — E66.813 OBESITY, CLASS III, BMI 40-49.9 (MORBID OBESITY): Primary | ICD-10-CM

## 2025-08-22 PROCEDURE — RECHECK: Performed by: DIETITIAN, REGISTERED

## (undated) DEVICE — Device

## (undated) DEVICE — CLAMP: Brand: ENDO BABCOCK

## (undated) DEVICE — ARTICULATING RELOAD WITH TRI-STAPLE TECHNOLOGY: Brand: ENDO GIA

## (undated) DEVICE — EXOFIN PRECISION PEN HIGH VISCOSITY TOPICAL SKIN ADHESIVE: Brand: EXOFIN PRECISION PEN, 1G

## (undated) DEVICE — ASTOUND STANDARD SURGICAL GOWN, XL: Brand: CONVERTORS

## (undated) DEVICE — ALLENTOWN LAP CHOLE APP PACK: Brand: CARDINAL HEALTH

## (undated) DEVICE — REM POLYHESIVE ADULT PATIENT RETURN ELECTRODE: Brand: VALLEYLAB

## (undated) DEVICE — TROCAR: Brand: KII FIOS FIRST ENTRY

## (undated) DEVICE — DISPOSABLE OR TOWEL: Brand: CARDINAL HEALTH

## (undated) DEVICE — TUBING SMOKE EVAC W/FILTRATION DEVICE PLUMEPORT ACTIV

## (undated) DEVICE — POWER SHELL: Brand: SIGNIA

## (undated) DEVICE — MARYLAND JAW LAPAROSCOPIC SEALER/DIVIDER COATED: Brand: LIGASURE

## (undated) DEVICE — CLIP APPLIER: Brand: ENDO CLIP II

## (undated) DEVICE — METZENBAUM ADTEC SINGLE USE DISSECTING SCISSORS, SHAFT ONLY, MONOPOLAR, CURVED TO LEFT, WORKING LENGTH: 12 1/4", (310 MM), DIAM. 5 MM, INSULATED, DOUBLE ACTION, STERILE, DISPOSABLE, PACKAGE OF 10 PIECES: Brand: AESCULAP

## (undated) DEVICE — TROCARS: Brand: KII® BALLOON BLUNT TIP SYSTEM

## (undated) DEVICE — VIOLET BRAIDED (POLYGLACTIN 910), SYNTHETIC ABSORBABLE SUTURE: Brand: COATED VICRYL

## (undated) DEVICE — 2000CC GUARDIAN II: Brand: GUARDIAN

## (undated) DEVICE — LAPAROSCOPIC TROCAR SLEEVE/SINGLE USE: Brand: KII® SLEEVE

## (undated) DEVICE — PMI DISPOSABLE PUNCTURE CLOSURE DEVICE / SUTURE GRASPER: Brand: PMI

## (undated) DEVICE — SUTURING DEVICE: Brand: ENDO STITCH

## (undated) DEVICE — DECANTER: Brand: UNBRANDED

## (undated) DEVICE — DEFENDO AIR WATER SUCTION AND BIOPSY VALVE KIT: Brand: DEFENDO AIR/WATER/SUCTION AND BIOPSY VALVE

## (undated) DEVICE — VISUALIZATION SYSTEM: Brand: CLEARIFY

## (undated) DEVICE — CIRCULAR STAPLER WITH DST SERIES TECHNOLOGY: Brand: EEA

## (undated) DEVICE — [HIGH FLOW INSUFFLATOR,  DO NOT USE IF PACKAGE IS DAMAGED,  KEEP DRY,  KEEP AWAY FROM SUNLIGHT,  PROTECT FROM HEAT AND RADIOACTIVE SOURCES.]: Brand: PNEUMOSURE

## (undated) DEVICE — TRANSORAL CIRCULAR STAPLER ANVIL ADVANCING PROXIMAL GUIDE SUTURE: Brand: EEA ORVIL

## (undated) DEVICE — LAPROSCOPIC CURVED SPATULA ELECTRODE: Brand: CLEANCOAT

## (undated) DEVICE — TISSUE RETRIEVAL SYSTEM: Brand: INZII RETRIEVAL SYSTEM

## (undated) DEVICE — KENDALL SCD SEQUENTIAL COMPRESSION COMFORT SLEEVES, KNEE LENGTH, MEDIUM: Brand: KENDALL SCD

## (undated) DEVICE — INTENDED FOR TISSUE SEPARATION, AND OTHER PROCEDURES THAT REQUIRE A SHARP SURGICAL BLADE TO PUNCTURE OR CUT.: Brand: BARD-PARKER SAFETY BLADES SIZE 15, STERILE

## (undated) DEVICE — TRAVELKIT CONTAINS FIRST STEP KIT (200ML EP-4 KIT) AND SOILED SCOPE BAG - 1 KIT: Brand: TRAVELKIT CONTAINS FIRST STEP KIT AND SOILED SCOPE BAG

## (undated) DEVICE — NEPTUNE E-SEP SMOKE EVACUATION PENCIL, COATED, 70MM BLADE, PUSH BUTTON SWITCH: Brand: NEPTUNE E-SEP

## (undated) DEVICE — TIBURON LAPAROSCOPIC ABDOMINAL DRAPE: Brand: CONVERTORS